# Patient Record
Sex: FEMALE | Race: WHITE | NOT HISPANIC OR LATINO | URBAN - METROPOLITAN AREA
[De-identification: names, ages, dates, MRNs, and addresses within clinical notes are randomized per-mention and may not be internally consistent; named-entity substitution may affect disease eponyms.]

---

## 2019-04-18 ENCOUNTER — ON CAMPUS - OUTPATIENT (AMBULATORY)
Dept: URBAN - METROPOLITAN AREA HOSPITAL 77 | Facility: HOSPITAL | Age: 59
End: 2019-04-18

## 2019-04-18 DIAGNOSIS — K20.8 OTHER ESOPHAGITIS: ICD-10-CM

## 2019-04-18 DIAGNOSIS — R13.10 DYSPHAGIA, UNSPECIFIED: ICD-10-CM

## 2019-04-18 DIAGNOSIS — K22.2 ESOPHAGEAL OBSTRUCTION: ICD-10-CM

## 2019-04-18 DIAGNOSIS — K21.9 GASTRO-ESOPHAGEAL REFLUX DISEASE WITHOUT ESOPHAGITIS: ICD-10-CM

## 2019-04-18 DIAGNOSIS — K44.9 DIAPHRAGMATIC HERNIA WITHOUT OBSTRUCTION OR GANGRENE: ICD-10-CM

## 2019-04-18 PROCEDURE — 43450 DILATE ESOPHAGUS 1/MULT PASS: CPT | Performed by: INTERNAL MEDICINE

## 2019-04-18 PROCEDURE — 43235 EGD DIAGNOSTIC BRUSH WASH: CPT | Performed by: INTERNAL MEDICINE

## 2019-10-31 ENCOUNTER — ON CAMPUS - OUTPATIENT (AMBULATORY)
Dept: URBAN - METROPOLITAN AREA HOSPITAL 77 | Facility: HOSPITAL | Age: 59
End: 2019-10-31

## 2019-10-31 DIAGNOSIS — K22.70 BARRETT'S ESOPHAGUS WITHOUT DYSPLASIA: ICD-10-CM

## 2019-10-31 DIAGNOSIS — K21.9 GASTRO-ESOPHAGEAL REFLUX DISEASE WITHOUT ESOPHAGITIS: ICD-10-CM

## 2019-10-31 DIAGNOSIS — R13.10 DYSPHAGIA, UNSPECIFIED: ICD-10-CM

## 2019-10-31 DIAGNOSIS — K44.9 DIAPHRAGMATIC HERNIA WITHOUT OBSTRUCTION OR GANGRENE: ICD-10-CM

## 2019-10-31 PROCEDURE — 43450 DILATE ESOPHAGUS 1/MULT PASS: CPT | Performed by: INTERNAL MEDICINE

## 2019-10-31 PROCEDURE — 43239 EGD BIOPSY SINGLE/MULTIPLE: CPT | Performed by: INTERNAL MEDICINE

## 2021-03-08 ENCOUNTER — TELEPHONE (OUTPATIENT)
Dept: PODIATRY | Facility: CLINIC | Age: 61
End: 2021-03-08

## 2021-03-08 NOTE — TELEPHONE ENCOUNTER
Caller: PATIENT    Relationship to patient: SELF    Best call back number: [phone]     Chief complaint: RIGHT FOOT PAIN    Type of visit NEW PATIENT       I  Additional notes:PT. CALLED TO SCHEDULE NEW PT. APPT. FOR RIGHT FOOT PAIN. PT. STATES THAT SHE FELL WHILE MOWING GRASS 6-7 MONTHS AGO ON HER FOOT, AND IS STILL HAVING PAIN.   SHE MADE APPT. FOR 03/25/21- WHICH WAS FIRST AVAILABLE FOR DR. MO.   SHE IS OK FOR THIS DATE, BUT WANTED TO CHECK TO SEE IF SHE NEEDS TO BE SEEN SOONER.   PLEASE ADVISE.

## 2021-03-17 ENCOUNTER — OFFICE VISIT (OUTPATIENT)
Dept: ORTHOPEDIC SURGERY | Facility: CLINIC | Age: 61
End: 2021-03-17

## 2021-03-17 VITALS
HEART RATE: 98 BPM | WEIGHT: 225 LBS | DIASTOLIC BLOOD PRESSURE: 95 MMHG | BODY MASS INDEX: 33.33 KG/M2 | SYSTOLIC BLOOD PRESSURE: 156 MMHG | HEIGHT: 69 IN

## 2021-03-17 DIAGNOSIS — M25.562 ACUTE PAIN OF LEFT KNEE: Primary | ICD-10-CM

## 2021-03-17 DIAGNOSIS — M17.12 PRIMARY OSTEOARTHRITIS OF LEFT KNEE: ICD-10-CM

## 2021-03-17 PROCEDURE — 99203 OFFICE O/P NEW LOW 30 MIN: CPT | Performed by: FAMILY MEDICINE

## 2021-03-17 RX ORDER — MELOXICAM 15 MG/1
15 TABLET ORAL DAILY
Qty: 30 TABLET | Refills: 0 | Status: SHIPPED | OUTPATIENT
Start: 2021-03-17 | End: 2021-04-09

## 2021-03-17 NOTE — PROGRESS NOTES
"Primary Care Sports Medicine Office Visit Note     Patient ID: Ophelia Funez is a 60 y.o. female.    Chief Complaint:  Chief Complaint   Patient presents with   • Left Knee - Pain     Pain X1 week     HPI:    Ms. Ophelia Funez is a 60 y.o. female who presents to the clinic today for evaluation of L knee pain. She state she had a fall in late august, when she rolled her R ankle. Since then, she feels she is \"walking differently\". She now is having a moderate amount of pain to her L knee. Achy soreness, deep inside of the knee. Points to the medial retinaculum and patellar tendon when describing her pain. Worse with going down stairs. Occasionally popping with normal walking. Mild instability as well. Has attempted aleve, advil, neither of which seem to help much.     No past medical history on file.    No past surgical history on file.    No family history on file.  Social History     Occupational History   • Not on file   Tobacco Use   • Smoking status: Never Smoker   Substance and Sexual Activity   • Alcohol use: Not on file   • Drug use: Not on file   • Sexual activity: Not on file      Review of Systems   Constitutional: Negative for activity change and fever.   Respiratory: Negative for cough and shortness of breath.    Cardiovascular: Negative for chest pain.   Gastrointestinal: Negative for constipation, diarrhea, nausea and vomiting.   Musculoskeletal: Positive for arthralgias.   Skin: Negative for color change and rash.   Neurological: Negative for weakness.   Hematological: Does not bruise/bleed easily.       Objective:    /95   Pulse 98   Ht 175.3 cm (69\")   Wt 102 kg (225 lb)   BMI 33.23 kg/m²     Physical Examination:  Physical Exam  Vitals and nursing note reviewed.   Constitutional:       General: She is not in acute distress.     Appearance: She is well-developed. She is not diaphoretic.   HENT:      Head: Normocephalic and atraumatic.   Eyes:      Conjunctiva/sclera: " Conjunctivae normal.   Pulmonary:      Effort: Pulmonary effort is normal. No respiratory distress.   Musculoskeletal:      Left knee: No effusion.      Instability Tests: Medial Jose J test negative and lateral Jose J test negative.   Skin:     General: Skin is warm.      Capillary Refill: Capillary refill takes less than 2 seconds.   Neurological:      Mental Status: She is alert.       Left Ankle Exam     Range of Motion   The patient has normal left ankle ROM.       Left Knee Exam     Muscle Strength   The patient has normal left knee strength.    Tenderness   The patient is experiencing tenderness in the lateral joint line, medial joint line and patella.    Range of Motion   Extension: normal   Flexion: normal     Tests   Jose J:  Medial - negative Lateral - negative  Varus: negative Valgus: negative  Left knee patellar apprehension test: +patellar grind testing, +evangelist ma testing.    Other   Erythema: absent  Sensation: normal  Pulse: present (distal to the knee, DP and PT palpable)  Swelling: none  Effusion: no effusion present          Imaging and other tests:  Three-view XR left knee yields mild tricompartmental joint space narrowing consistent with mild osteoarthritic change.    Assessment and Plan:    1. Acute pain of left knee  - XR Knee 3 View Left  - meloxicam (Mobic) 15 MG tablet; Take 1 tablet by mouth Daily.  Dispense: 30 tablet; Refill: 0  - Ambulatory Referral to Physical Therapy Evaluate and treat; Stretching, ROM, Strengthening    2. Primary osteoarthritis of left knee  - Ambulatory Referral to Physical Therapy Evaluate and treat; Stretching, ROM, Strengthening    After discussion of pathology, I recommended we take a conservative approach as this is early in the disease course.  I would like to see this patient attempt weight loss as every 1 pound lost will help take 4 pounds of pressure off the knees.  I also recommended icing 3 times daily as needed after activity.  The patient can  "try glucosamine/chondroitin supplementation for knee fluidity.  I would also like to see this patient exercise 20 to 30 minutes a day 3 days a week.  Working on quadriceps strengthening.  She was also given prescription for meloxicam for anti-inflammatory benefit.  RTC in 3 to 6 months if no improvement, or sooner if symptoms worsen.  We can always consider corticosteroid injection at that time if needed.      Albert LUO \"Chance\" Steven WEBSTER DO, CAQSM  03/17/21  16:56 EDT    Disclaimer: Please note that areas of this note were completed with computer voice recognition software.  Quite often unanticipated grammatical, syntax, homophones, and other interpretive errors are inadvertently transcribed by the computer software. Please excuse any errors that have escaped final proofreading.  "

## 2021-03-23 ENCOUNTER — TREATMENT (OUTPATIENT)
Dept: PHYSICAL THERAPY | Facility: CLINIC | Age: 61
End: 2021-03-23

## 2021-03-23 DIAGNOSIS — G89.29 CHRONIC PAIN OF LEFT KNEE: ICD-10-CM

## 2021-03-23 DIAGNOSIS — M17.12 PRIMARY OSTEOARTHRITIS OF LEFT KNEE: Primary | ICD-10-CM

## 2021-03-23 DIAGNOSIS — M25.562 CHRONIC PAIN OF LEFT KNEE: ICD-10-CM

## 2021-03-23 PROCEDURE — 97110 THERAPEUTIC EXERCISES: CPT | Performed by: PHYSICAL THERAPIST

## 2021-03-23 PROCEDURE — 97163 PT EVAL HIGH COMPLEX 45 MIN: CPT | Performed by: PHYSICAL THERAPIST

## 2021-03-23 NOTE — PROGRESS NOTES
Physical Therapy Initial Evaluation and Plan of Care    Patient: Ophelia Funez   : 1960  Diagnosis/ICD-10 Code:  Primary osteoarthritis of left knee [M17.12]  Referring practitioner: Albert Harris I*  Date of Initial Visit: 3/23/2021  Today's Date: 3/23/2021  Patient seen for 1 sessions           Subjective Questionnaire: Oxford knee L       Subjective Evaluation    History of Present Illness  Mechanism of injury: Patient reports that she woke up about 10 days ago with intense L knee pain.   She states that she was seen at the  due to the pain and also sent to ortho.  She was seen last Wed at ortho with PT referral and script for meloxicam. ( after careful chart review and verification with patient, she was only seen at the ortho office vs )  She states the knee does pop, with stiffness and intense pain. No history of ex.  Ophelia also states that she fall approx 7 months ago with intense R ankle pain and swelling; she did not seek medical assessment for the foot.  However, due to continued pain and swelling she [facility]. Ophelia notes that she is unable to wear regular shoes due to the R ankle pain and swelling.   Ms Funez reports that descending stairs are worse than ascend ( both are painful)  TIME OF DAY:  No change in the pain.    SLEEP:  Normal is BSL.  Still sleeps on side; pain wakes her some of the time.    1ST AM:  Very stiff  PAST HISTORY:  CA, DM  Pacemaker, heart seizures or metal implants.       Patient Occupation: customer ; seated, computer Quality of life: good    Pain  Current pain ratin  At best pain ratin  At worst pain rating: 10  Relieving factors: medications  Aggravating factors: stairs    Social Support  Lives in: multiple-level home (bi level house; no steps from the garage.)    Hand dominance: right    Treatments  Current treatment: medication  Patient Goals  Patient goals for therapy:  decreased pain  Patient goal: ex and walk            Objective          Observations     Additional Ankle/Foot Observation Details  Bilateral 1+ pitting edema with min hairs and a shiny appearance.  Appearance also noted in the gait section of the evaluation.     Tenderness   Left Knee   Tenderness in the lateral joint line, medial joint line, patellar tendon and tibial tubercle.     Right Ankle/Foot   Tenderness in the Achilles insertion, fifth metatarsal base, lateral malleolus, navicular and posterior tibial tendon.     Active Range of Motion   Left Knee   Flexion: 103 degrees   Extensor la degrees     Right Ankle/Foot   Dorsiflexion (kf): 6 degrees   Plantar flexion: WFL and with pain  Inversion: 23 degrees   Eversion: 9 degrees with pain    Patellar Mobility   Left Knee Hypomobile in the left medial, left lateral, left superior and left inferior patellar tendon(s).     Strength/Myotome Testing     Left Knee   Flexion: 3+  Extension: 3+    Right Ankle/Foot   Dorsiflexion: 4-  Plantar flexion: 3+  Inversion: 4-  Eversion: 4-    Tests     Left Knee   Positive patella-femoral grind.   Negative lateral Jose J and medial Jose J.     Ambulation     Observational Gait   Gait: antalgic   Decreased walking speed, stride length and left stance time.   Left foot contact pattern: foot flat  Right foot contact pattern: foot flat    Additional Observational Gait Details  Patient wore slip on sandals into the clinic for the eval; states she is unable to wear enclosed shoes due to the foot swelling and ankle pain.  Severe Pes planus, hallux valgus, subluxed hunter during stance and saravanan bunions.   Gait is with L knee locked and increased R hip ER, no heel strike, more of a slide bilateral.           Assessment & Plan     Assessment  Impairments: abnormal gait, abnormal muscle firing, abnormal or restricted ROM, activity intolerance, impaired balance, impaired physical strength, lacks appropriate home exercise program and  pain with function  Assessment details: Ms. Funez is a 60 yr old female referred to PT due to a recent onset of L knee pain and difficulty walking due to the pain.   She also reports during the history intake a fall approx 7 months ago resulting in R ankle pain.  The initial PT evaluation was completed today for both areas.  Deficits noted of the L knee include reduced and painful active motion, weakness, multiple gait deficits and inability to tolerate standing or difficulty with stairs.  She lives in a bi-level home; thus steps are required on a daily basis.  The R ankle presents with reduced ankle impairments, also including a reduction in her motion, strength and reduced ability to stand/ambulate.  There are bilateral structural feet mal-alignments including pes planus, hallux valgus, hammer toes. The treatment plan will be designed to address both areas.    EVAL COMPLEXITY:  High  # areas assess:  > 3  Prior history >3  evloving  Decision making   Prognosis: good  Prognosis details: Adverse factors:  High BMI, no prior ex, structural foot impairments,  Functional Limitations: walking, uncomfortable because of pain and standing  Goals  Plan Goals: ST visits     1)  Pain levels 2-6 L knee     2)  L knee and R ankle motion WNLs     3)  Patient to report increase tolerance to standing by 15 min intervals     4)  Gait with min hi ER, evidence of heel strike, no locking of the L knee throughout the gait cycle     5)  Compliance with current  HEP    LTGs   DC     1)  0-3/10 L knee and R ankle pain     2)  Normal gait cycle     3)  MMT L knee and R ankle =/> 4/5 wo greater pain     4)  Patient to report improved ability to ascend and descend steps relating to the L knee and R ankle     5)  Dillon in final home ex program for management at home     6)  Improve the Oxford knee score =/> 10 points    Plan  Therapy options: will be seen for skilled physical therapy services  Planned modality interventions:  cryotherapy, dry needling, high voltage pulsed current (pain management), thermotherapy (hydrocollator packs) and ultrasound  Planned therapy interventions: neuromuscular re-education, soft tissue mobilization, therapeutic activities, stretching, strengthening, joint mobilization, home exercise program, gait training, flexibility and balance/weight-bearing training  Frequency: 2x week  Treatment plan discussed with: patient  Plan details: 36 visits up to 12 weeks/90 days max time frame for completion.         Timed:         Manual Therapy:         mins  80759;     Therapeutic Exercise:    17     mins  52821;     Neuromuscular Yennifer:        mins  10290;    Therapeutic Activity:          mins  98164;     Gait Training:           mins  62029;     Ultrasound:          mins  10074;    Ionto                                   mins   87034  Self Care                       3     mins   94976  Canalith Repos         mins 47163      Un-Timed:  Electrical Stimulation:         mins  60373 ( );  Dry Needling          mins self-pay  Traction          mins 36309  Low Eval          Mins  24168  Mod Eval          Mins  43003  High Eval                       29     Mins  27476  Re-Eval                               mins  01400        Timed Treatment:   20   mins   Total Treatment:     49   mins    PT SIGNATURE: Taylor Rodriguez, CANDIDA   DATE TREATMENT INITIATED: 3/23/2021    Initial Certification  Certification Period: 6/21/2021  I certify that the therapy services are furnished while this patient is under my care.  The services outlined above are required by this patient, and will be reviewed every 90 days.     PHYSICIAN: Albert Harris II, DO      DATE:     Please sign and return via fax to [phone].. Thank you, [facility].

## 2021-03-25 ENCOUNTER — OFFICE VISIT (OUTPATIENT)
Dept: PODIATRY | Facility: CLINIC | Age: 61
End: 2021-03-25

## 2021-03-25 VITALS
SYSTOLIC BLOOD PRESSURE: 146 MMHG | HEART RATE: 80 BPM | WEIGHT: 261 LBS | HEIGHT: 69 IN | DIASTOLIC BLOOD PRESSURE: 87 MMHG | BODY MASS INDEX: 38.66 KG/M2

## 2021-03-25 DIAGNOSIS — S82.841P: ICD-10-CM

## 2021-03-25 DIAGNOSIS — M19.171 POST-TRAUMATIC OSTEOARTHRITIS OF RIGHT ANKLE: ICD-10-CM

## 2021-03-25 DIAGNOSIS — M25.571 ACUTE RIGHT ANKLE PAIN: Primary | ICD-10-CM

## 2021-03-25 DIAGNOSIS — M20.11 ACQUIRED HALLUX VALGUS, RIGHT: ICD-10-CM

## 2021-03-25 DIAGNOSIS — M19.071 PRIMARY OSTEOARTHRITIS OF RIGHT FOOT: ICD-10-CM

## 2021-03-25 PROCEDURE — 99203 OFFICE O/P NEW LOW 30 MIN: CPT | Performed by: PODIATRIST

## 2021-03-25 NOTE — PROGRESS NOTES
03/25/2021  Foot and Ankle Surgery - New Patient   Provider: Dr. Hector Diego DPM  Location: [facility]    Subjective:  Ophelia Funez is a 60 y.o. female.     Chief Complaint   Patient presents with   • Right Foot - Pain       HPI: Patient is a 60-year-old female that presents with issues involving her right ankle.  She sustained a fall in August of last year and did not feel that she had any significant injury as she could bear weight.  She has dealt with progressive pain that has intensified over the last few months.  She rates the pain a 9 out of 10 at times.  She states that she requires sitting throughout the day to manage the pain.  She does feel like her foot is progressively getting flatter.  She is concerned that symptoms will progress and further limitation will ensue.  She denies any significant issues involving her left foot.  She states that she has always had significant bunion deformities to both feet.    Allergies   Allergen Reactions   • Codeine Palpitations and Rash       History reviewed. No pertinent past medical history.    History reviewed. No pertinent surgical history.    Family History   Problem Relation Age of Onset   • No Known Problems Mother    • No Known Problems Father        Social History     Socioeconomic History   • Marital status:      Spouse name: Not on file   • Number of children: Not on file   • Years of education: Not on file   • Highest education level: Not on file   Tobacco Use   • Smoking status: Never Smoker   • Smokeless tobacco: Never Used   Vaping Use   • Vaping Use: Never used   Substance and Sexual Activity   • Alcohol use: Never   • Drug use: Defer   • Sexual activity: Defer        Current Outpatient Medications on File Prior to Visit   Medication Sig Dispense Refill   • meloxicam (Mobic) 15 MG tablet Take 1 tablet by mouth Daily. 30 tablet 0     No current facility-administered medications on file prior to visit.       Review of  "Systems:  General: Denies fever, chills, fatigue, and weakness.  Eyes: Denies vision loss, blurry vision, and excessive redness.  ENT: Denies hearing issues and difficulty swallowing.  Cardiovascular: Denies palpitations, chest pain, or syncopal episodes.  Respiratory: Denies shortness of breath, wheezing, and coughing.  GI: Denies abdominal pain, nausea, and vomiting.   : Denies frequency, hematuria, and urgency.  Musculoskeletal: + Right ankle pain  Derm: Denies rash, open wounds, or suspicious lesions.  Neuro: Denies headaches, numbness, loss of coordination, and tremors.  Psych: Denies anxiety and depression.  Endocrine: Denies temperature intolerance and changes in appetite.  Heme: Denies bleeding disorders or abnormal bruising.     Objective   /87   Pulse 80   Ht 175.3 cm (69\")   Wt 118 kg (261 lb)   BMI 38.54 kg/m²     Foot/Ankle Exam:       General:   Appearance: appears stated age and healthy    Orientation: AAOx3    Affect: appropriate    Gait: antalgic    Gait comment:  Significant pronation and forefoot abduction with stance and gait    VASCULAR      Right Foot Vascularity   Normal vascular exam    Dorsalis pedis:  2+  Posterior tibial:  2+  Skin Temperature: warm    Edema Gradin+ and pitting  CFT:  < 3 seconds  Pedal Hair Growth:  Absent      NEUROLOGIC     Right Foot Neurologic   Light touch sensation:  Normal  Hot/Cold sensation: normal    Protective Sensation using [place]-H[place] Monofilament:  10  Achilles reflex:  2+     MUSCULOSKELETAL      Right Foot Musculoskeletal   Ecchymosis:  None  Tenderness: medial malleolus, lateral malleolus, subtalar joint, posterior tibial tendon and anterior tibiotalar joint line    Arch:  Pes planus  Hammertoe:  Second toe, third toe, fourth toe and fifth toe  Hallux valgus: Yes (Severe deformity)       MUSCLE STRENGTH     Right Foot Muscle Strength   Foot dorsiflexion:  4+  Foot plantar flexion:  4+  Foot inversion:  4+  Foot eversion:  4+     " DERMATOLOGIC     Right Foot Dermatologic   Skin: skin intact        Right Foot Additional Comments Significant valgus deformity involving the tibiotalar joint.  Prominent instability across the deltoid ligament.  Diffuse discomfort to the perimalleoli region.  No proximal fibular pain.  No significant discomfort involving the foot.      Assessment/Plan   Diagnoses and all orders for this visit:    1. Acute right ankle pain (Primary)  -     XR Ankle 3+ View Right    2. Post-traumatic osteoarthritis of right ankle  -     CBC (No Diff); Future  -     Basic Metabolic Panel; Future  -     ECG 12 Lead; Future  -     XR Chest 2 View; Future  -     ceFAZolin (ANCEF) 3 g in sodium chloride 0.9 % 100 mL IVPB  -     Case Request; Standing    3. Acquired hallux valgus, right  -     XR Foot 3+ View Right    4. Primary osteoarthritis of right foot    5. Displaced bimalleolar fracture, right, closed, with malunion, subsequent encounter  -     CBC (No Diff); Future  -     Basic Metabolic Panel; Future  -     ECG 12 Lead; Future  -     XR Chest 2 View; Future  -     ceFAZolin (ANCEF) 3 g in sodium chloride 0.9 % 100 mL IVPB  -     Case Request; Standing    Other orders  -     Follow Anesthesia Guidelines / Standing Orders; Future  -     Obtain Informed Consent; Future  -     Provide NPO Instructions to Patient; Future  -     Chlorhexidine Skin Prep; Future  -     Follow Anesthesia Guidelines / Standing Orders; Standing  -     Clip Operative Site; Standing  -     Verify / Perform Chlorhexidine Skin Prep; Standing  -     Verify / Perform Chlorhexidine Skin Prep if Indicated (If Not Already Completed); Standing      Patient presents with issue involving the right ankle.  She has substantial deformity involving the ankle which has gradually progressed since an injury of last year in August.  Imaging was reviewed showing findings consistent with bimalleolar ankle fracture equivalent and malunion.  She currently has lateral displacement  of the tibiotalar joint with valgus angulation of the talus.  She has significant posttraumatic arthritis.  We did review the imaging, diagnosis, and treatment options at length.  Unfortunately, I do not feel that any conservative options will offer any lasting effects.  We did discuss bracing however she is basically bearing weight to the medial malleolus at this time and I do feel that she will eventually create further complications.  We did discuss surgical consideration and I feel that the only option is ankle arthrodesis with lateral malleolus takedown.  I reviewed the procedure, risk, goals, and recovery at length.  She does understand that she will require several weeks of nonweightbearing activity after surgery.  I have dispensed a lace up ankle brace for her to wear at this time.  Patient would like to proceed with the surgery in the near future.  We will perform the preoperative lab work and imaging to proceed.  She is to call with any additional issues or concerns.    Orders Placed This Encounter   Procedures   • XR Ankle 3+ View Right     Order Specific Question:   Reason for Exam:     Answer:   Right ankle pain after a fall last August has had pain since RM 15 WB     Order Specific Question:   Does this patient have a diabetic monitoring/medication delivering device on?     Answer:   No   • XR Foot 3+ View Right     Weight Bearing     Order Specific Question:   Reason for Exam:     Answer:   Right foot pain she does have bunion with toe pain RM 15 WB   • XR Chest 2 View     Standing Status:   Future     Standing Expiration Date:   3/25/2022     Order Specific Question:   Reason for Exam:     Answer:   Preop   • CBC (No Diff)     Standing Status:   Future     Standing Expiration Date:   3/25/2022   • Basic Metabolic Panel     Standing Status:   Future     Standing Expiration Date:   3/25/2022   • Follow Anesthesia Guidelines / Standing Orders     Standing Status:   Future   • Obtain Informed Consent      Standing Status:   Future     Order Specific Question:   Informed Consent Given For     Answer:   Right sided ankle arthrodesis with lateral malleolus takedown   • Provide NPO Instructions to Patient     Standing Status:   Future   • Chlorhexidine Skin Prep     Chlorhexidine Skin Prep and Instructions For All Patients Having A Procedure Requiring an Outward Incision if Not Allergic. If Allergic, Give Antibacterial Skin Wipes and Instructions. Do Not Use For Facial Cases or on Any Mucus Membranes.     Standing Status:   Future   • ECG 12 Lead     Standing Status:   Future     Standing Expiration Date:   3/25/2022     Order Specific Question:   Reason for Exam:     Answer:   Preop        Note is dictated utilizing voice recognition software. Unfortunately this leads to occasional typographical errors. I apologize in advance if the situation occurs. If questions occur please do not hesitate to call our office.

## 2021-03-26 PROBLEM — S82.841P: Status: ACTIVE | Noted: 2021-03-26

## 2021-03-26 PROBLEM — M19.171 POST-TRAUMATIC OSTEOARTHRITIS OF RIGHT ANKLE: Status: ACTIVE | Noted: 2021-03-26

## 2021-04-08 ENCOUNTER — TELEPHONE (OUTPATIENT)
Dept: PODIATRY | Facility: CLINIC | Age: 61
End: 2021-04-08

## 2021-04-08 DIAGNOSIS — M25.562 ACUTE PAIN OF LEFT KNEE: ICD-10-CM

## 2021-04-08 RX ORDER — OMEPRAZOLE 40 MG/1
40 CAPSULE, DELAYED RELEASE ORAL DAILY
COMMUNITY

## 2021-04-08 RX ORDER — MULTIPLE VITAMINS W/ MINERALS TAB 9MG-400MCG
1 TAB ORAL DAILY
COMMUNITY

## 2021-04-09 RX ORDER — MELOXICAM 15 MG/1
TABLET ORAL
Qty: 30 TABLET | Refills: 0 | Status: SHIPPED | OUTPATIENT
Start: 2021-04-09 | End: 2021-04-16 | Stop reason: HOSPADM

## 2021-04-12 ENCOUNTER — HOSPITAL ENCOUNTER (OUTPATIENT)
Dept: CARDIOLOGY | Facility: HOSPITAL | Age: 61
Discharge: HOME | End: 2021-04-12

## 2021-04-12 ENCOUNTER — HOSPITAL ENCOUNTER (OUTPATIENT)
Dept: GENERAL RADIOLOGY | Facility: HOSPITAL | Age: 61
Discharge: HOME | End: 2021-04-12

## 2021-04-12 ENCOUNTER — LAB (OUTPATIENT)
Dept: LAB | Facility: HOSPITAL | Age: 61
End: 2021-04-12

## 2021-04-12 DIAGNOSIS — M19.171 POST-TRAUMATIC OSTEOARTHRITIS OF RIGHT ANKLE: ICD-10-CM

## 2021-04-12 DIAGNOSIS — S82.841P: ICD-10-CM

## 2021-04-12 LAB
ANION GAP SERPL CALCULATED.3IONS-SCNC: 12.6 MMOL/L (ref 5–15)
BUN SERPL-MCNC: 14 MG/DL (ref 6–20)
BUN/CREAT SERPL: 15.7 (ref 7–25)
CALCIUM SPEC-SCNC: 9.7 MG/DL (ref 8.6–10.5)
CHLORIDE SERPL-SCNC: 102 MMOL/L (ref 98–107)
CO2 SERPL-SCNC: 25.4 MMOL/L (ref 22–29)
CREAT SERPL-MCNC: 0.89 MG/DL (ref 0.57–1)
DEPRECATED RDW RBC AUTO: 39.7 FL (ref 37–54)
ERYTHROCYTE [DISTWIDTH] IN BLOOD BY AUTOMATED COUNT: 12.2 % (ref 12.3–15.4)
GFR SERPL CREATININE-BSD FRML MDRD: 65 ML/MIN/1.73
GLUCOSE SERPL-MCNC: 116 MG/DL (ref 65–99)
HCT VFR BLD AUTO: 49.8 % (ref 34–46.6)
HGB BLD-MCNC: 16.6 G/DL (ref 12–15.9)
MCH RBC QN AUTO: 29.8 PG (ref 26.6–33)
MCHC RBC AUTO-ENTMCNC: 33.3 G/DL (ref 31.5–35.7)
MCV RBC AUTO: 89.4 FL (ref 79–97)
PLATELET # BLD AUTO: 271 10*3/MM3 (ref 140–450)
PMV BLD AUTO: 10.2 FL (ref 6–12)
POTASSIUM SERPL-SCNC: 4.2 MMOL/L (ref 3.5–5.2)
RBC # BLD AUTO: 5.57 10*6/MM3 (ref 3.77–5.28)
SODIUM SERPL-SCNC: 140 MMOL/L (ref 136–145)
WBC # BLD AUTO: 6.68 10*3/MM3 (ref 3.4–10.8)

## 2021-04-12 PROCEDURE — 71046 X-RAY EXAM CHEST 2 VIEWS: CPT

## 2021-04-12 PROCEDURE — 80048 BASIC METABOLIC PNL TOTAL CA: CPT

## 2021-04-12 PROCEDURE — 93010 ELECTROCARDIOGRAM REPORT: CPT | Performed by: INTERNAL MEDICINE

## 2021-04-12 PROCEDURE — 93005 ELECTROCARDIOGRAM TRACING: CPT | Performed by: PODIATRIST

## 2021-04-12 PROCEDURE — 36415 COLL VENOUS BLD VENIPUNCTURE: CPT

## 2021-04-12 PROCEDURE — 85027 COMPLETE CBC AUTOMATED: CPT

## 2021-04-14 ENCOUNTER — OFFICE VISIT (OUTPATIENT)
Dept: ORTHOPEDIC SURGERY | Facility: CLINIC | Age: 61
End: 2021-04-14

## 2021-04-14 ENCOUNTER — LAB (OUTPATIENT)
Dept: LAB | Facility: HOSPITAL | Age: 61
End: 2021-04-14

## 2021-04-14 VITALS — HEIGHT: 69 IN | WEIGHT: 252 LBS | BODY MASS INDEX: 37.33 KG/M2

## 2021-04-14 DIAGNOSIS — M17.12 PRIMARY OSTEOARTHRITIS OF LEFT KNEE: Primary | ICD-10-CM

## 2021-04-14 LAB — SARS-COV-2 ORF1AB RESP QL NAA+PROBE: NOT DETECTED

## 2021-04-14 PROCEDURE — 99213 OFFICE O/P EST LOW 20 MIN: CPT | Performed by: FAMILY MEDICINE

## 2021-04-14 PROCEDURE — C9803 HOPD COVID-19 SPEC COLLECT: HCPCS

## 2021-04-14 PROCEDURE — U0004 COV-19 TEST NON-CDC HGH THRU: HCPCS

## 2021-04-14 RX ORDER — DICLOFENAC SODIUM 20 MG/G
40 SOLUTION TOPICAL 2 TIMES DAILY
Qty: 112 G | Refills: 3 | Status: SHIPPED | OUTPATIENT
Start: 2021-04-14 | End: 2022-06-30

## 2021-04-14 NOTE — PROGRESS NOTES
"Primary Care Sports Medicine Office Visit Note     Patient ID: Ophelia Funez is a 60 y.o. female.    Chief Complaint:  Chief Complaint   Patient presents with   • Left Knee - Follow-up     HPI:    Ms. Ophelia Funez is a 60 y.o. female who presents to the clinic today for follow-up evaluation of left knee osteoarthritis.  The patient was previously seen and evaluated, and conservative measures were chosen.  She has been taking meloxicam anti-inflammatory since previous visit as instructed.  She has also started physical therapy for quadricep strengthening of the left knee.  Attempting weight loss as well.  She states that with conservative measures, her knee pain was doing incredible well. She had returned to walking without a limp on meloxicam. Was doing very well. Unfortunately, she has had to stop taking her mobic due to impending foot surgery, and pain has again worsened/returned.  Similar in severity and location as previous.  No new injuries or falls.    No past medical history on file.    Past Surgical History:   Procedure Laterality Date   • ENDOSCOPY     • HERNIA REPAIR      as a child       Family History   Problem Relation Age of Onset   • No Known Problems Mother    • No Known Problems Father      Social History     Occupational History   • Not on file   Tobacco Use   • Smoking status: Never Smoker   • Smokeless tobacco: Never Used   Vaping Use   • Vaping Use: Never used   Substance and Sexual Activity   • Alcohol use: Never   • Drug use: Defer   • Sexual activity: Defer      Review of Systems   Constitutional: Negative for activity change and fever.   Musculoskeletal: Positive for arthralgias.   Skin: Negative for color change and rash.   Neurological: Negative for weakness.       Objective:    Ht 175.3 cm (69\")   Wt 114 kg (252 lb)   BMI 37.21 kg/m²     Physical Examination:  Physical Exam  Vitals and nursing note reviewed.   Constitutional:       General: She is not in acute distress.   " "  Appearance: She is well-developed. She is not diaphoretic.   HENT:      Head: Normocephalic and atraumatic.   Eyes:      Conjunctiva/sclera: Conjunctivae normal.   Pulmonary:      Effort: Pulmonary effort is normal. No respiratory distress.   Musculoskeletal:      Left knee: No effusion.      Instability Tests: Medial Jose J test negative and lateral Jose J test negative.   Skin:     General: Skin is warm.      Capillary Refill: Capillary refill takes less than 2 seconds.   Neurological:      Mental Status: She is alert.       Left Ankle Exam     Range of Motion   The patient has normal left ankle ROM.       Left Knee Exam     Muscle Strength   The patient has normal left knee strength.    Tenderness   The patient is experiencing tenderness in the lateral joint line, medial joint line and patella.    Range of Motion   Extension: normal   Flexion: normal     Tests   Jose J:  Medial - negative Lateral - negative  Varus: negative Valgus: negative  Left knee patellar apprehension test: +patellar grind testing, +evangelist ma testing.    Other   Erythema: absent  Sensation: normal  Pulse: present (distal to the knee, DP and PT palpable)  Swelling: none  Effusion: no effusion present        Imaging and other tests:  No new imaging today.  Assessment and Plan:    1. Primary osteoarthritis of left knee    Patient seems to have an excellent response to anti-inflammatory medication.  Unfortunately with stopping this with upcoming surgery, her symptomatology has returned.  I would like for her to attempt topical anti-inflammatory in the form of Pennsaid (very low systemic absorption, less than 7%) over the next few days leading up to surgery.  Otherwise return to meloxicam after surgical procedure.  RTC to me as needed.    Albert LUO \"Chance\" Steven WEBSTER DO, CAQSM  04/14/21  15:23 EDT    Disclaimer: Please note that areas of this note were completed with computer voice recognition software.  Quite often unanticipated " grammatical, syntax, homophones, and other interpretive errors are inadvertently transcribed by the computer software. Please excuse any errors that have escaped final proofreading.

## 2021-04-15 ENCOUNTER — ANESTHESIA EVENT (OUTPATIENT)
Dept: PERIOP | Facility: HOSPITAL | Age: 61
End: 2021-04-15

## 2021-04-16 ENCOUNTER — APPOINTMENT (OUTPATIENT)
Dept: GENERAL RADIOLOGY | Facility: HOSPITAL | Age: 61
End: 2021-04-16

## 2021-04-16 ENCOUNTER — HOSPITAL ENCOUNTER (OUTPATIENT)
Facility: HOSPITAL | Age: 61
Setting detail: HOSPITAL OUTPATIENT SURGERY
Discharge: HOME | End: 2021-04-16
Attending: PODIATRIST | Admitting: PODIATRIST

## 2021-04-16 ENCOUNTER — ANESTHESIA (OUTPATIENT)
Dept: PERIOP | Facility: HOSPITAL | Age: 61
End: 2021-04-16

## 2021-04-16 VITALS
OXYGEN SATURATION: 98 % | BODY MASS INDEX: 36.85 KG/M2 | RESPIRATION RATE: 16 BRPM | DIASTOLIC BLOOD PRESSURE: 61 MMHG | SYSTOLIC BLOOD PRESSURE: 135 MMHG | HEART RATE: 95 BPM | WEIGHT: 248.8 LBS | HEIGHT: 69 IN | TEMPERATURE: 96.8 F

## 2021-04-16 DIAGNOSIS — M19.171 POST-TRAUMATIC OSTEOARTHRITIS OF RIGHT ANKLE: ICD-10-CM

## 2021-04-16 DIAGNOSIS — S82.841P: ICD-10-CM

## 2021-04-16 PROCEDURE — 73620 X-RAY EXAM OF FOOT: CPT

## 2021-04-16 PROCEDURE — 27635 REMOVE LOWER LEG BONE LESION: CPT | Performed by: PODIATRIST

## 2021-04-16 PROCEDURE — 25010000002 FENTANYL CITRATE (PF) 100 MCG/2ML SOLUTION: Performed by: ANESTHESIOLOGIST ASSISTANT

## 2021-04-16 PROCEDURE — 25010000002 DEXAMETHASONE PER 1 MG: Performed by: ANESTHESIOLOGIST ASSISTANT

## 2021-04-16 PROCEDURE — 25010000002 ONDANSETRON PER 1 MG: Performed by: ANESTHESIOLOGIST ASSISTANT

## 2021-04-16 PROCEDURE — 76000 FLUOROSCOPY <1 HR PHYS/QHP: CPT

## 2021-04-16 PROCEDURE — 25010000002 MIDAZOLAM PER 1 MG: Performed by: ANESTHESIOLOGY

## 2021-04-16 PROCEDURE — 76942 ECHO GUIDE FOR BIOPSY: CPT | Performed by: PODIATRIST

## 2021-04-16 PROCEDURE — C1713 ANCHOR/SCREW BN/BN,TIS/BN: HCPCS | Performed by: PODIATRIST

## 2021-04-16 PROCEDURE — C1769 GUIDE WIRE: HCPCS | Performed by: PODIATRIST

## 2021-04-16 PROCEDURE — 25010000002 HYDROMORPHONE PER 4 MG: Performed by: ANESTHESIOLOGIST ASSISTANT

## 2021-04-16 PROCEDURE — 25010000002 PROPOFOL 10 MG/ML EMULSION: Performed by: ANESTHESIOLOGIST ASSISTANT

## 2021-04-16 PROCEDURE — 25010000002 DEXAMETHASONE PER 1 MG: Performed by: ANESTHESIOLOGY

## 2021-04-16 PROCEDURE — 25010000002 FENTANYL CITRATE (PF) 100 MCG/2ML SOLUTION: Performed by: ANESTHESIOLOGY

## 2021-04-16 PROCEDURE — 25010000002 KETOROLAC TROMETHAMINE PER 15 MG: Performed by: ANESTHESIOLOGIST ASSISTANT

## 2021-04-16 PROCEDURE — 27870 FUSION OF ANKLE JOINT OPEN: CPT | Performed by: PODIATRIST

## 2021-04-16 PROCEDURE — 25010000002 ROPIVACAINE PER 1 MG: Performed by: ANESTHESIOLOGY

## 2021-04-16 PROCEDURE — 27685 REVISION OF LOWER LEG TENDON: CPT | Performed by: PODIATRIST

## 2021-04-16 DEVICE — IMPLANTABLE DEVICE: Type: IMPLANTABLE DEVICE | Site: ANKLE | Status: FUNCTIONAL

## 2021-04-16 DEVICE — IMPLANTABLE DEVICE
Type: IMPLANTABLE DEVICE | Site: ANKLE | Status: FUNCTIONAL
Brand: DARCO

## 2021-04-16 DEVICE — IMPLANTABLE DEVICE
Type: IMPLANTABLE DEVICE | Site: ANKLE | Status: FUNCTIONAL
Brand: ALLOMATRIX

## 2021-04-16 DEVICE — INJ BONE AUG 3CC: Type: IMPLANTABLE DEVICE | Site: ANKLE | Status: FUNCTIONAL

## 2021-04-16 RX ORDER — HYDROMORPHONE HCL 110MG/55ML
0.25 PATIENT CONTROLLED ANALGESIA SYRINGE INTRAVENOUS AS NEEDED
Status: DISCONTINUED | OUTPATIENT
Start: 2021-04-16 | End: 2021-04-16 | Stop reason: HOSPADM

## 2021-04-16 RX ORDER — DROPERIDOL 2.5 MG/ML
0.62 INJECTION, SOLUTION INTRAMUSCULAR; INTRAVENOUS ONCE AS NEEDED
Status: DISCONTINUED | OUTPATIENT
Start: 2021-04-16 | End: 2021-04-16 | Stop reason: HOSPADM

## 2021-04-16 RX ORDER — DEXAMETHASONE SODIUM PHOSPHATE 4 MG/ML
INJECTION, SOLUTION INTRA-ARTICULAR; INTRALESIONAL; INTRAMUSCULAR; INTRAVENOUS; SOFT TISSUE
Status: COMPLETED | OUTPATIENT
Start: 2021-04-16 | End: 2021-04-16

## 2021-04-16 RX ORDER — DROPERIDOL 2.5 MG/ML
1.25 INJECTION, SOLUTION INTRAMUSCULAR; INTRAVENOUS ONCE AS NEEDED
Status: DISCONTINUED | OUTPATIENT
Start: 2021-04-16 | End: 2021-04-16 | Stop reason: HOSPADM

## 2021-04-16 RX ORDER — KETOROLAC TROMETHAMINE 30 MG/ML
INJECTION, SOLUTION INTRAMUSCULAR; INTRAVENOUS AS NEEDED
Status: DISCONTINUED | OUTPATIENT
Start: 2021-04-16 | End: 2021-04-16 | Stop reason: SURG

## 2021-04-16 RX ORDER — ROPIVACAINE HYDROCHLORIDE 5 MG/ML
INJECTION, SOLUTION EPIDURAL; INFILTRATION; PERINEURAL
Status: COMPLETED | OUTPATIENT
Start: 2021-04-16 | End: 2021-04-16

## 2021-04-16 RX ORDER — SODIUM CHLORIDE 0.9 % (FLUSH) 0.9 %
10 SYRINGE (ML) INJECTION EVERY 12 HOURS SCHEDULED
Status: DISCONTINUED | OUTPATIENT
Start: 2021-04-16 | End: 2021-04-16 | Stop reason: HOSPADM

## 2021-04-16 RX ORDER — LIDOCAINE HYDROCHLORIDE 10 MG/ML
0.5 INJECTION, SOLUTION EPIDURAL; INFILTRATION; INTRACAUDAL; PERINEURAL ONCE AS NEEDED
Status: DISCONTINUED | OUTPATIENT
Start: 2021-04-16 | End: 2021-04-16 | Stop reason: HOSPADM

## 2021-04-16 RX ORDER — FENTANYL CITRATE 50 UG/ML
25 INJECTION, SOLUTION INTRAMUSCULAR; INTRAVENOUS
Status: DISCONTINUED | OUTPATIENT
Start: 2021-04-16 | End: 2021-04-16 | Stop reason: HOSPADM

## 2021-04-16 RX ORDER — DEXAMETHASONE SODIUM PHOSPHATE 4 MG/ML
INJECTION, SOLUTION INTRA-ARTICULAR; INTRALESIONAL; INTRAMUSCULAR; INTRAVENOUS; SOFT TISSUE AS NEEDED
Status: DISCONTINUED | OUTPATIENT
Start: 2021-04-16 | End: 2021-04-16 | Stop reason: SURG

## 2021-04-16 RX ORDER — MIDAZOLAM HYDROCHLORIDE 1 MG/ML
INJECTION INTRAMUSCULAR; INTRAVENOUS
Status: COMPLETED | OUTPATIENT
Start: 2021-04-16 | End: 2021-04-16

## 2021-04-16 RX ORDER — DIPHENHYDRAMINE HYDROCHLORIDE 50 MG/ML
12.5 INJECTION INTRAMUSCULAR; INTRAVENOUS AS NEEDED
Status: DISCONTINUED | OUTPATIENT
Start: 2021-04-16 | End: 2021-04-16 | Stop reason: HOSPADM

## 2021-04-16 RX ORDER — EPHEDRINE SULFATE 50 MG/ML
5 INJECTION, SOLUTION INTRAVENOUS ONCE AS NEEDED
Status: DISCONTINUED | OUTPATIENT
Start: 2021-04-16 | End: 2021-04-16 | Stop reason: HOSPADM

## 2021-04-16 RX ORDER — FLUMAZENIL 0.1 MG/ML
0.2 INJECTION INTRAVENOUS AS NEEDED
Status: DISCONTINUED | OUTPATIENT
Start: 2021-04-16 | End: 2021-04-16 | Stop reason: HOSPADM

## 2021-04-16 RX ORDER — ONDANSETRON 2 MG/ML
INJECTION INTRAMUSCULAR; INTRAVENOUS AS NEEDED
Status: DISCONTINUED | OUTPATIENT
Start: 2021-04-16 | End: 2021-04-16 | Stop reason: SURG

## 2021-04-16 RX ORDER — SODIUM CHLORIDE, SODIUM LACTATE, POTASSIUM CHLORIDE, CALCIUM CHLORIDE 600; 310; 30; 20 MG/100ML; MG/100ML; MG/100ML; MG/100ML
9 INJECTION, SOLUTION INTRAVENOUS CONTINUOUS PRN
Status: DISCONTINUED | OUTPATIENT
Start: 2021-04-16 | End: 2021-04-16 | Stop reason: HOSPADM

## 2021-04-16 RX ORDER — LABETALOL HYDROCHLORIDE 5 MG/ML
5 INJECTION, SOLUTION INTRAVENOUS
Status: DISCONTINUED | OUTPATIENT
Start: 2021-04-16 | End: 2021-04-16 | Stop reason: HOSPADM

## 2021-04-16 RX ORDER — FENTANYL CITRATE 50 UG/ML
100 INJECTION, SOLUTION INTRAMUSCULAR; INTRAVENOUS
Status: DISCONTINUED | OUTPATIENT
Start: 2021-04-16 | End: 2021-04-16 | Stop reason: HOSPADM

## 2021-04-16 RX ORDER — HYDROMORPHONE HCL 110MG/55ML
0.5 PATIENT CONTROLLED ANALGESIA SYRINGE INTRAVENOUS AS NEEDED
Status: DISCONTINUED | OUTPATIENT
Start: 2021-04-16 | End: 2021-04-16 | Stop reason: HOSPADM

## 2021-04-16 RX ORDER — HYDROMORPHONE HCL 110MG/55ML
1 PATIENT CONTROLLED ANALGESIA SYRINGE INTRAVENOUS AS NEEDED
Status: DISCONTINUED | OUTPATIENT
Start: 2021-04-16 | End: 2021-04-16 | Stop reason: HOSPADM

## 2021-04-16 RX ORDER — NALOXONE HCL 0.4 MG/ML
0.4 VIAL (ML) INJECTION AS NEEDED
Status: DISCONTINUED | OUTPATIENT
Start: 2021-04-16 | End: 2021-04-16 | Stop reason: HOSPADM

## 2021-04-16 RX ORDER — ONDANSETRON 2 MG/ML
4 INJECTION INTRAMUSCULAR; INTRAVENOUS ONCE AS NEEDED
Status: DISCONTINUED | OUTPATIENT
Start: 2021-04-16 | End: 2021-04-16 | Stop reason: HOSPADM

## 2021-04-16 RX ORDER — HYDROCODONE BITARTRATE AND ACETAMINOPHEN 7.5; 325 MG/1; MG/1
1 TABLET ORAL EVERY 6 HOURS PRN
Qty: 28 TABLET | Refills: 0 | Status: SHIPPED | OUTPATIENT
Start: 2021-04-16 | End: 2021-04-29

## 2021-04-16 RX ORDER — FENTANYL CITRATE 50 UG/ML
INJECTION, SOLUTION INTRAMUSCULAR; INTRAVENOUS AS NEEDED
Status: DISCONTINUED | OUTPATIENT
Start: 2021-04-16 | End: 2021-04-16 | Stop reason: SURG

## 2021-04-16 RX ORDER — FENTANYL CITRATE 50 UG/ML
50 INJECTION, SOLUTION INTRAMUSCULAR; INTRAVENOUS
Status: DISCONTINUED | OUTPATIENT
Start: 2021-04-16 | End: 2021-04-16 | Stop reason: HOSPADM

## 2021-04-16 RX ORDER — PHENYLEPHRINE HCL IN 0.9% NACL 0.5 MG/5ML
.5-3 SYRINGE (ML) INTRAVENOUS CONTINUOUS PRN
Status: DISCONTINUED | OUTPATIENT
Start: 2021-04-16 | End: 2021-04-16 | Stop reason: HOSPADM

## 2021-04-16 RX ORDER — KETAMINE HYDROCHLORIDE 10 MG/ML
INJECTION INTRAMUSCULAR; INTRAVENOUS AS NEEDED
Status: DISCONTINUED | OUTPATIENT
Start: 2021-04-16 | End: 2021-04-16 | Stop reason: SURG

## 2021-04-16 RX ORDER — PROPOFOL 10 MG/ML
VIAL (ML) INTRAVENOUS AS NEEDED
Status: DISCONTINUED | OUTPATIENT
Start: 2021-04-16 | End: 2021-04-16 | Stop reason: SURG

## 2021-04-16 RX ORDER — SODIUM CHLORIDE 0.9 % (FLUSH) 0.9 %
10 SYRINGE (ML) INJECTION AS NEEDED
Status: DISCONTINUED | OUTPATIENT
Start: 2021-04-16 | End: 2021-04-16 | Stop reason: HOSPADM

## 2021-04-16 RX ORDER — LIDOCAINE HYDROCHLORIDE 10 MG/ML
INJECTION, SOLUTION EPIDURAL; INFILTRATION; INTRACAUDAL; PERINEURAL AS NEEDED
Status: DISCONTINUED | OUTPATIENT
Start: 2021-04-16 | End: 2021-04-16 | Stop reason: SURG

## 2021-04-16 RX ORDER — CEFAZOLIN SODIUM IN 0.9 % NACL 3 G/100 ML
3 INTRAVENOUS SOLUTION, PIGGYBACK (ML) INTRAVENOUS ONCE
Status: DISCONTINUED | OUTPATIENT
Start: 2021-04-16 | End: 2021-04-16 | Stop reason: HOSPADM

## 2021-04-16 RX ORDER — FENTANYL CITRATE 50 UG/ML
INJECTION, SOLUTION INTRAMUSCULAR; INTRAVENOUS
Status: COMPLETED | OUTPATIENT
Start: 2021-04-16 | End: 2021-04-16

## 2021-04-16 RX ADMIN — DEXAMETHASONE SODIUM PHOSPHATE 4 MG: 4 INJECTION, SOLUTION INTRAMUSCULAR; INTRAVENOUS at 07:43

## 2021-04-16 RX ADMIN — HYDROMORPHONE HYDROCHLORIDE 0.2 MG: 2 INJECTION INTRAMUSCULAR; INTRAVENOUS; SUBCUTANEOUS at 08:12

## 2021-04-16 RX ADMIN — SODIUM CHLORIDE, POTASSIUM CHLORIDE, SODIUM LACTATE AND CALCIUM CHLORIDE 9 ML/HR: 600; 310; 30; 20 INJECTION, SOLUTION INTRAVENOUS at 06:36

## 2021-04-16 RX ADMIN — FENTANYL CITRATE 50 MCG: 50 INJECTION, SOLUTION INTRAMUSCULAR; INTRAVENOUS at 07:43

## 2021-04-16 RX ADMIN — PROPOFOL 150 MG: 10 INJECTION, EMULSION INTRAVENOUS at 07:38

## 2021-04-16 RX ADMIN — FENTANYL CITRATE 50 MCG: 50 INJECTION, SOLUTION INTRAMUSCULAR; INTRAVENOUS at 07:53

## 2021-04-16 RX ADMIN — DEXAMETHASONE SODIUM PHOSPHATE 4 MG: 4 INJECTION, SOLUTION INTRAMUSCULAR; INTRAVENOUS at 07:25

## 2021-04-16 RX ADMIN — KETAMINE HYDROCHLORIDE 30 MG: 10 INJECTION, SOLUTION INTRAMUSCULAR; INTRAVENOUS at 07:54

## 2021-04-16 RX ADMIN — CEFAZOLIN SODIUM 2 G: 1 INJECTION, POWDER, FOR SOLUTION INTRAMUSCULAR; INTRAVENOUS at 07:43

## 2021-04-16 RX ADMIN — ONDANSETRON 4 MG: 2 INJECTION INTRAMUSCULAR; INTRAVENOUS at 09:57

## 2021-04-16 RX ADMIN — HYDROMORPHONE HYDROCHLORIDE 0.2 MG: 2 INJECTION INTRAMUSCULAR; INTRAVENOUS; SUBCUTANEOUS at 10:06

## 2021-04-16 RX ADMIN — HYDROMORPHONE HYDROCHLORIDE 0.5 MG: 2 INJECTION, SOLUTION INTRAMUSCULAR; INTRAVENOUS; SUBCUTANEOUS at 10:49

## 2021-04-16 RX ADMIN — HYDROMORPHONE HYDROCHLORIDE 0.2 MG: 2 INJECTION INTRAMUSCULAR; INTRAVENOUS; SUBCUTANEOUS at 10:14

## 2021-04-16 RX ADMIN — FENTANYL CITRATE 100 MCG: 50 INJECTION, SOLUTION INTRAMUSCULAR; INTRAVENOUS at 07:25

## 2021-04-16 RX ADMIN — PROPOFOL 50 MG: 10 INJECTION, EMULSION INTRAVENOUS at 07:53

## 2021-04-16 RX ADMIN — MIDAZOLAM 2 MG: 1 INJECTION INTRAMUSCULAR; INTRAVENOUS at 07:25

## 2021-04-16 RX ADMIN — ROPIVACAINE HYDROCHLORIDE 20 ML: 5 INJECTION, SOLUTION EPIDURAL; INFILTRATION; PERINEURAL at 07:29

## 2021-04-16 RX ADMIN — ROPIVACAINE HYDROCHLORIDE 40 ML: 5 INJECTION, SOLUTION EPIDURAL; INFILTRATION; PERINEURAL at 07:25

## 2021-04-16 RX ADMIN — KETOROLAC TROMETHAMINE 30 MG: 30 INJECTION, SOLUTION INTRAMUSCULAR at 10:19

## 2021-04-16 RX ADMIN — LIDOCAINE HYDROCHLORIDE 100 MG: 10 INJECTION, SOLUTION EPIDURAL; INFILTRATION; INTRACAUDAL; PERINEURAL at 07:38

## 2021-04-16 RX ADMIN — HYDROMORPHONE HYDROCHLORIDE 0.2 MG: 2 INJECTION INTRAMUSCULAR; INTRAVENOUS; SUBCUTANEOUS at 10:02

## 2021-04-16 RX ADMIN — HYDROMORPHONE HYDROCHLORIDE 0.5 MG: 2 INJECTION, SOLUTION INTRAMUSCULAR; INTRAVENOUS; SUBCUTANEOUS at 11:04

## 2021-04-16 RX ADMIN — HYDROMORPHONE HYDROCHLORIDE 0.2 MG: 2 INJECTION INTRAMUSCULAR; INTRAVENOUS; SUBCUTANEOUS at 09:29

## 2021-04-16 RX ADMIN — DEXAMETHASONE SODIUM PHOSPHATE 4 MG: 4 INJECTION, SOLUTION INTRAMUSCULAR; INTRAVENOUS at 07:29

## 2021-04-16 RX ADMIN — HYDROMORPHONE HYDROCHLORIDE 0.2 MG: 2 INJECTION INTRAMUSCULAR; INTRAVENOUS; SUBCUTANEOUS at 08:01

## 2021-04-16 NOTE — OP NOTE
Operative Note   Foot and Ankle Surgery   Provider: Dr. Hector Diego   Location: [facility]      Procedure:  1.  Lateral malleolus excision, right  2.  Open ankle arthrodesis, right  3.  [place] of autograft, right  4.  Tendo Achilles lengthening, right    Pre-operative Diagnosis:   1.  Closed, displaced ankle fracture with malunion, right  2.  Ankle derangement and arthritis, right    Post-operative Diagnosis: Same    Surgeon: Hector Diego    Assistant: Yon Raygoza, PGY 3    Anesthesia: General with block    Implants: 7.0 and 6.5 mm right medical cannulated screws; Augment and AlloMatrix graft    Findings: Significant degenerative changes involving the tibiotalar joint with dislocation    Specimen: None    Blood Loss: Approximately 20 cc    Complications: None    Post Op Plan: Discharge home.  Strict nonweightbearing activity.  Follow-up with me in 2 weeks.    Summary:    Patient is a 60-year-old female who has been seen in office for right foot and ankle pain.  Patient states that the symptoms have been present since an injury last year.  She was not evaluated or treated for that issue and states that the foot has progressively deformed and she complains of substantial pain with any amount of activity.  Imaging was reviewed showing evidence of prior ankle fracture and subsequent malunion with significant degenerative changes involving the tibiotalar joint.  We did review the diagnoses and treatment options.  Options are limited and I do not feel that conservative options will offer any long-term improvement.  We did discuss surgical management including tibiotalar arthrodesis which would require lateral malleolus takedown.  She does understand that she will require several weeks of nonweightbearing after the surgery.  We did review the risks of delayed and nonunion and potential for additional surgeries in the future.  Patient understands and agrees and would like to proceed.    Procedure, risks,  complications, and goals were discussed with the patient at bedside.  Risks include but are not limited to infection, complications from anesthesia (including death), chronic pain or numbness, hematoma/seroma, deep vein thrombosis, wound complications, and potential for additional surgical procedures.  Patient understands and elects to proceed with surgery at this time. Informed consent was obtained before proceeding to the operating suite.  All questions were answered to the patient's satisfaction. No guarantees or assurances were given or implied.    Procedure:    Patient brought to the operating room placed on operative table in supine position.  Once adequate general anesthesia was administered, a pneumatic tourniquet was placed about the patient's right thigh.  The right lower extremity was scrubbed prepped and draped in usual sterile fashion.  The limb was elevated and exsanguinated and pneumatic tourniquet was inflated to 300 mmHg.  A formal timeout was conducted prior to incision.    Attention was then directed to the lateral aspect of the ankle.  Expansile incision was performed to the distal midline of the fibula extending onto the lateral aspect of the foot.  Incision was performed in layers to the level of bone.  Vital structures were identified and preserved.  Bleeders are cauterized as needed.  The previous fracture was identified with heterotopic bone formation.  A sagittal saw was then used to resect at the level of the fracture site from a proximal lateral to distal medial orientation.  Dissection was continued along the anterior distal and posterior aspects of the lateral malleolus until it was fully excised.  The bone was placed on the back table to use for autograft later in the procedure.    C-arm fluoroscopy was used to evaluate reduction of the ankle joint which showed continued gapping involving the medial gutter.  The decision was made to make a medial incision.  This was performed over the  medial malleolus and extending distally over the deltoid.  Incision was performed to bone.  The saphenous vein was retracted throughout the duration of the procedure.  A linear longitudinal incision was performed through the deltoid ligament.  A rongeur was used to remove all fibrotic tissue from the medial gutter.  Again, reduction was attempted under fluoroscopy showing adequate reduction in the AP plane however anterior subluxation on the sagittal.  Decision was made to proceed with tendo Achilles lengthening.  This was performed through 3 stab incisions performed 2 cm apart.  Two medial and 1 lateral incision was made.  Adequate release was achieved and finally reduction was achieved to the tibiotalar joint in AP and lateral orientation.    Joint prep was performed with curettes and rongeurs removing all articular cartilage from the joint site.  The joint was flushed with copious amounts of normal saline.  Fenestration of the talus and tibia was performed with a 2-0 drill bit and fish scaling with osteotomes.  Augment and 5 cc of AlloMatrix graft were combined and applied to the tibiotalar joint.  Temporary fixation was achieved under fluoroscopic guidance.  Autograft from the lateral malleolus was achieved by splitting the bone on the back table and curetting out the cancellous bone.  The bone was then used to fill all voids to the anterior and medial gutter of the tibiotalar joint.    There was difficulty with screw fixation given the size of the screws and location.  Two screw construct was achieved.  A guidewire was placed from a medial proximal to distal lateral orientation across the tibiotalar joint.  Definitive fixation was achieved utilizing a 7.0mm cannulated screw.  Rigidity was noted across the tibiotalar joint.  A secondary 6.5 mm screw was then performed from a posterior to anterior orientation.  A third screw was not able to be used due to difficulty with the screw placement.  The tibiotalar joint  fusion site is stable and rectus in alignment.  Final imaging was performed.  Wounds were irrigated with copious amounts of normal saline.  The deltoid ligament was reconstructed utilizing a 2-0 Ethibond in a simple interrupted manner.  Deep structures were closed with a 2-0 Vicryl in simple interrupted manner.  Subcutaneous tissues were closed with 4-0 Vicryl and skin was closed with staples.  The incisions were dressed with Xeroform and sterile compressive dressings.  The tourniquet was released and a prompt hyperemic response was noted to all digits of the right lower extremity.  The limb was placed into a well-padded bivalved cast.  Patient tolerated the procedure and anesthesia well.  She was transferred from the operating room to the recovery room with vital signs stable and neurovascular is unchanged to the right lower extremity.      Dr. Hector Diego DPM  [facility]  [phone]    Note is dictated utilizing voice recognition software. Unfortunately this leads to occasional typographical errors. I apologize in advance if the situation occurs. If questions occur please do not hesitate to call our office.

## 2021-04-16 NOTE — ANESTHESIA PREPROCEDURE EVALUATION
Anesthesia Evaluation     Patient summary reviewed and Nursing notes reviewed   NPO Solid Status: > 8 hours  NPO Liquid Status: > 8 hours           Airway   Mallampati: II  TM distance: >3 FB  Neck ROM: full  No difficulty expected  Dental - normal exam     Pulmonary    Cardiovascular         Neuro/Psych  GI/Hepatic/Renal/Endo    (+) obesity,  GERD,      Musculoskeletal     Abdominal    Substance History      OB/GYN          Other   arthritis,                      Anesthesia Plan    ASA 2     general with block     intravenous induction     Anesthetic plan, all risks, benefits, and alternatives have been provided, discussed and informed consent has been obtained with: patient.    Plan discussed with CRNA and CAA.

## 2021-04-16 NOTE — ANESTHESIA PROCEDURE NOTES
Peripheral Block    Pre-sedation assessment completed: 4/16/2021 7:17 AM    Patient reassessed immediately prior to procedure    Patient location during procedure: pre-op  Start time: 4/16/2021 7:17 AM  Stop time: 4/16/2021 7:25 AM  Reason for block: at surgeon's request and post-op pain management  Performed by  Anesthesiologist: Wyatt Ann MD  Preanesthetic Checklist  Completed: patient identified, IV checked, site marked, risks and benefits discussed, surgical consent, monitors and equipment checked, pre-op evaluation and timeout performed  Prep:  Pt Position: supine  Sterile barriers:cap, gloves, mask and washed/disinfected hands  Prep: ChloraPrep  Patient monitoring: blood pressure monitoring, continuous pulse oximetry and EKG  Procedure  Sedation:yes  Performed under: MAC  Guidance:ultrasound guided  ULTRASOUND INTERPRETATION. Using ultrasound guidance a 21 G gauge needle was placed in close proximity to the nerve, at which point, under ultrasound guidance anesthetic was injected in the area of the nerve and spread of the anesthesia was seen on ultrasound in close proximity thereto.  There were no abnormalities seen on ultrasound; a digital image was taken; and the patient tolerated the procedure with no complications. Images:still images obtained, printed/placed on chart    Laterality:right  Block Type:popliteal  Injection Technique:single-shot  Needle Type:echogenic  Needle Gauge:21 G    Sedation medications used: midazolam (VERSED) injection, 2 mg  fentaNYL citrate (PF) (SUBLIMAZE) injection, 100 mcg  Medications Used: dexamethasone (DECADRON) injection, 4 mg  ropivacaine (NAROPIN) 0.5 % injection, 40 mL  Med admintered at 4/16/2021 7:25 AM      Post Assessment  Injection Assessment: negative aspiration for heme, no paresthesia on injection and incremental injection  Patient Tolerance:comfortable throughout block  Complications:no

## 2021-04-16 NOTE — ANESTHESIA POSTPROCEDURE EVALUATION
Patient: Ophelia Funez    Procedure Summary     Date: 04/16/21 Room / Location: [facility] OR 09 / [facility] OR    Anesthesia Start: 0732 Anesthesia Stop: 1028    Procedures:       ANKLE ARTHRODESIS with lateral malleolus takedown, with bone autograft (Right Ankle)      ACHILLES TENDON LENGTHENING (Right Leg Lower) Diagnosis:       Post-traumatic osteoarthritis of right ankle      Displaced bimalleolar fracture, right, closed, with malunion, subsequent encounter      (Post-traumatic osteoarthritis of right ankle [M19.171])      (Displaced bimalleolar fracture, right, closed, with malunion, subsequent encounter [S82.841P])    Surgeons: EDITA Diego DPM Provider: Wyatt Ann MD    Anesthesia Type: general with block ASA Status: 2          Anesthesia Type: general with block    Vitals  Vitals Value Taken Time   /70 04/16/21 1134   Temp 97.8 °F (36.6 °C) 04/16/21 1127   Pulse 91 04/16/21 1134   Resp 12 04/16/21 1127   SpO2 100 % 04/16/21 1134   Vitals shown include unvalidated device data.        Post Anesthesia Care and Evaluation    Patient location during evaluation: PACU  Patient participation: complete - patient participated  Level of consciousness: awake  Pain scale: See nurse's notes for pain score.  Pain management: adequate  Airway patency: patent  Anesthetic complications: No anesthetic complications  PONV Status: none  Cardiovascular status: acceptable  Respiratory status: acceptable  Hydration status: acceptable    Comments: Patient seen and examined postoperatively; vital signs stable; SpO2 greater than or equal to 90%; cardiopulmonary status stable; nausea/vomiting adequately controlled; pain adequately controlled; no apparent anesthesia complications; patient discharged from anesthesia care when discharge criteria were met

## 2021-04-16 NOTE — ANESTHESIA PROCEDURE NOTES
Airway  Urgency: elective    Date/Time: 4/16/2021 7:39 AM  Airway not difficult    General Information and Staff    Patient location during procedure: OR  Anesthesiologist: Wyatt Ann MD  CRNA: Ariana Cho AA    Indications and Patient Condition  Indications for airway management: airway protection    Preoxygenated: yes  MILS maintained throughout  Mask difficulty assessment: 0 - not attempted    Final Airway Details  Final airway type: supraglottic airway      Successful airway: unique and LMA  Size 4    Number of attempts at approach: 1  Assessment: lips, teeth, and gum same as pre-op and atraumatic intubation    Additional Comments  Placed by CADENCE Draper

## 2021-04-16 NOTE — ANESTHESIA PROCEDURE NOTES
Peripheral Block    Pre-sedation assessment completed: 4/16/2021 7:29 AM    Patient reassessed immediately prior to procedure    Patient location during procedure: OR  Start time: 4/16/2021 7:25 AM  Stop time: 4/16/2021 7:29 AM  Reason for block: at surgeon's request and post-op pain management  Performed by  Anesthesiologist: Wyatt Ann MD  Preanesthetic Checklist  Completed: patient identified, IV checked, site marked, risks and benefits discussed, surgical consent, monitors and equipment checked, pre-op evaluation and timeout performed  Prep:  Pt Position: supine  Sterile barriers:cap, gloves, mask and washed/disinfected hands  Prep: ChloraPrep  Patient monitoring: blood pressure monitoring, continuous pulse oximetry and EKG  Procedure  Performed under: MAC  Guidance:ultrasound guided  ULTRASOUND INTERPRETATION. Using ultrasound guidance a 21 G gauge needle was placed in close proximity to the nerve, at which point, under ultrasound guidance anesthetic was injected in the area of the nerve and spread of the anesthesia was seen on ultrasound in close proximity thereto.  There were no abnormalities seen on ultrasound; a digital image was taken; and the patient tolerated the procedure with no complications. Images:still images obtained, printed/placed on chart    Laterality:right  Block Type:adductor canal block  Injection Technique:single-shot  Needle Type:echogenic  Needle Gauge:21 G      Medications Used: dexamethasone (DECADRON) injection, 4 mg  ropivacaine (NAROPIN) 0.5 % injection, 20 mL  Med admintered at 4/16/2021 7:29 AM      Post Assessment  Injection Assessment: negative aspiration for heme, no paresthesia on injection and incremental injection  Patient Tolerance:comfortable throughout block  Complications:no

## 2021-04-17 LAB — QT INTERVAL: 330 MS

## 2021-04-29 ENCOUNTER — OFFICE VISIT (OUTPATIENT)
Dept: PODIATRY | Facility: CLINIC | Age: 61
End: 2021-04-29

## 2021-04-29 VITALS
DIASTOLIC BLOOD PRESSURE: 86 MMHG | SYSTOLIC BLOOD PRESSURE: 142 MMHG | BODY MASS INDEX: 36.73 KG/M2 | HEIGHT: 69 IN | HEART RATE: 114 BPM | WEIGHT: 248 LBS

## 2021-04-29 DIAGNOSIS — M19.171 POST-TRAUMATIC OSTEOARTHRITIS OF RIGHT ANKLE: Primary | ICD-10-CM

## 2021-04-29 PROCEDURE — 99024 POSTOP FOLLOW-UP VISIT: CPT | Performed by: PODIATRIST

## 2021-04-29 NOTE — PROGRESS NOTES
"2021  Foot and Ankle Surgery - Established Patient/Follow-up  Provider: Dr. Hector Diego DPM  Location: [facility]    Subjective:  Ophelia Funez is a 60 y.o. female.     Chief Complaint   Patient presents with   • Right Ankle - Follow-up, Post-op       HPI: Patient is a 60-year-old female that returns 2 weeks after ankle arthrodesis.  She states that she is doing well and has remained off weightbearing.  No other issues today    Allergies   Allergen Reactions   • Codeine Palpitations and Rash       Current Outpatient Medications on File Prior to Visit   Medication Sig Dispense Refill   • Diclofenac Sodium (Pennsaid) 2 % solution Apply 40 mg topically 2 (two) times a day. 112 g 3   • multivitamin with minerals (MULTIVITAMIN ADULT PO) Take 1 tablet by mouth Daily.     • omeprazole (priLOSEC) 40 MG capsule Take 40 mg by mouth Daily.     • [DISCONTINUED] HYDROcodone-acetaminophen (NORCO) 7.5-325 MG per tablet Take 1 tablet by mouth Every 6 (Six) Hours As Needed for Moderate Pain  (Pain). 28 tablet 0     No current facility-administered medications on file prior to visit.       Objective   /86   Pulse 114   Ht 175.3 cm (69\")   Wt 112 kg (248 lb)   BMI 36.62 kg/m²     General:   Appearance: appears stated age and healthy    Orientation: AAOx3    Affect: appropriate    Gait: antalgic    Gait comment:  Significant pronation and forefoot abduction with stance and gait     VASCULAR       Right Foot Vascularity   Normal vascular exam    Dorsalis pedis:  2+  Posterior tibial:  2+  Skin Temperature: warm    Edema Gradin+ and pitting  CFT:  < 3 seconds  Pedal Hair Growth:  Absent      NEUROLOGIC      Right Foot Neurologic   Light touch sensation:  Normal  Hot/Cold sensation: normal    Protective Sensation using [place] Monofilament:  10  Achilles reflex:  2+      MUSCULOSKELETAL       Right Foot Musculoskeletal   Ecchymosis:  None  Tenderness: medial malleolus, lateral malleolus, " subtalar joint, posterior tibial tendon and anterior tibiotalar joint line    Arch:  Pes planus  Hammertoe:  Second toe, third toe, fourth toe and fifth toe  Hallux valgus: Yes (Severe deformity)        MUSCLE STRENGTH      Right Foot Muscle Strength   Foot dorsiflexion:  4+  Foot plantar flexion:  4+  Foot inversion:  4+  Foot eversion:  4+      DERMATOLOGIC      Right Foot Dermatologic   Skin: Incision sites are dry and stable with intact staples.  No evidence of dehiscence or infection      Right Foot Additional Comments: Rectus alignment of the ankle.    Assessment/Plan   Diagnoses and all orders for this visit:    1. Post-traumatic osteoarthritis of right ankle (Primary)      Patient is doing quite well.  Staples were removed today without issue.  She has no complicating features or signs of infection.  I have recommended that we proceed with strict nonweightbearing activities.  I would like her to continue using the knee scooter.  I have dispensed a cam boot and asked that she wear the boot for added protection with any assisted ambulation.  Patient may shower and bathe as needed.  We did discuss gentle range of motion exercises and manual therapy.  I would like her to return in 4 weeks for reevaluation and imaging.    No orders of the defined types were placed in this encounter.         Note is dictated utilizing voice recognition software. Unfortunately this leads to occasional typographical errors. I apologize in advance if the situation occurs. If questions occur please do not hesitate to call our office.

## 2021-05-03 DIAGNOSIS — M25.562 ACUTE PAIN OF LEFT KNEE: ICD-10-CM

## 2021-05-04 RX ORDER — MELOXICAM 15 MG/1
TABLET ORAL
Qty: 30 TABLET | Refills: 0 | Status: SHIPPED | OUTPATIENT
Start: 2021-05-04 | End: 2021-08-13

## 2021-05-27 ENCOUNTER — OFFICE VISIT (OUTPATIENT)
Dept: PODIATRY | Facility: CLINIC | Age: 61
End: 2021-05-27

## 2021-05-27 VITALS
DIASTOLIC BLOOD PRESSURE: 84 MMHG | HEART RATE: 112 BPM | BODY MASS INDEX: 36.73 KG/M2 | WEIGHT: 248 LBS | SYSTOLIC BLOOD PRESSURE: 138 MMHG | HEIGHT: 69 IN

## 2021-05-27 DIAGNOSIS — M19.171 POST-TRAUMATIC OSTEOARTHRITIS OF RIGHT ANKLE: Primary | ICD-10-CM

## 2021-05-27 PROCEDURE — 99024 POSTOP FOLLOW-UP VISIT: CPT | Performed by: PODIATRIST

## 2021-05-31 NOTE — PROGRESS NOTES
"2021  Foot and Ankle Surgery - Established Patient/Follow-up  Provider: Dr. Hector Diego DPM  Location: [facility]    Subjective:  Ophelia Funez is a 61 y.o. female.     Chief Complaint   Patient presents with   • Right Foot - Follow-up       HPI: Patient returns approximately 6 weeks after surgery.  She is doing well.  She has not had any substantial pain and has been able to remain off weightbearing.    Allergies   Allergen Reactions   • Codeine Palpitations and Rash       Current Outpatient Medications on File Prior to Visit   Medication Sig Dispense Refill   • meloxicam (MOBIC) 15 MG tablet TAKE 1 TABLET BY MOUTH EVERY DAY 30 tablet 0   • multivitamin with minerals (MULTIVITAMIN ADULT PO) Take 1 tablet by mouth Daily.     • omeprazole (priLOSEC) 40 MG capsule Take 40 mg by mouth Daily.     • Diclofenac Sodium (Pennsaid) 2 % solution Apply 40 mg topically 2 (two) times a day. 112 g 3     No current facility-administered medications on file prior to visit.       Objective   /84   Pulse 112   Ht 175.3 cm (69\")   Wt 112 kg (248 lb)   BMI 36.62 kg/m²     General:   Appearance: appears stated age and healthy    Orientation: AAOx3    Affect: appropriate    Gait: antalgic    Gait comment:  Significant pronation and forefoot abduction with stance and gait     VASCULAR       Right Foot Vascularity   Normal vascular exam    Dorsalis pedis:  2+  Posterior tibial:  2+  Skin Temperature: warm    Edema Gradin+ and pitting  CFT:  < 3 seconds  Pedal Hair Growth:  Absent      NEUROLOGIC      Right Foot Neurologic   Light touch sensation:  Normal  Hot/Cold sensation: normal    Protective Sensation using [place] Monofilament:  10  Achilles reflex:  2+      MUSCULOSKELETAL       Right Foot Musculoskeletal   Ecchymosis:  None  Tenderness: Mild discomfort to operative sites  Arch:  Pes planus  Hammertoe:  Second toe, third toe, fourth toe and fifth toe  Hallux valgus: Yes (Severe " deformity)        MUSCLE STRENGTH      Right Foot Muscle Strength   Foot dorsiflexion:  4+  Foot plantar flexion:  4+  Foot inversion:  4+  Foot eversion:  4+      DERMATOLOGIC      Right Foot Dermatologic   Skin: Incision sites are well-healed.    Assessment/Plan   Diagnoses and all orders for this visit:    1. Post-traumatic osteoarthritis of right ankle (Primary)    Other orders  -     Cancel: XR Foot 3+ View Right      Patient has noticed significant improvement since last exam.  She has remained off weightbearing.  Imaging was obtained and independently reviewed today showing stable internal fixation with incomplete arthrodesis of the tibiotalar joint.  At this time, I have not recommended that she proceed with partial protected weightbearing with the cam boot and crutch assistance as needed.  She is to avoid high-impact and excessive activities.  I would like to see her in 4 weeks for reevaluation and imaging    No orders of the defined types were placed in this encounter.         Note is dictated utilizing voice recognition software. Unfortunately this leads to occasional typographical errors. I apologize in advance if the situation occurs. If questions occur please do not hesitate to call our office.

## 2021-06-24 ENCOUNTER — OFFICE VISIT (OUTPATIENT)
Dept: PODIATRY | Facility: CLINIC | Age: 61
End: 2021-06-24

## 2021-06-24 VITALS
HEART RATE: 108 BPM | WEIGHT: 248 LBS | DIASTOLIC BLOOD PRESSURE: 87 MMHG | BODY MASS INDEX: 36.73 KG/M2 | SYSTOLIC BLOOD PRESSURE: 129 MMHG | HEIGHT: 69 IN

## 2021-06-24 DIAGNOSIS — M19.171 POST-TRAUMATIC OSTEOARTHRITIS OF RIGHT ANKLE: Primary | ICD-10-CM

## 2021-06-24 PROCEDURE — 99024 POSTOP FOLLOW-UP VISIT: CPT | Performed by: PODIATRIST

## 2021-06-24 NOTE — PROGRESS NOTES
"2021  Foot and Ankle Surgery - Established Patient/Follow-up  Provider: Dr. Hector Diego DPM  Location: [facility]    Subjective:  Ophelia Funez is a 61 y.o. female.     Chief Complaint   Patient presents with   • Right Ankle - Follow-up, Post-op       HPI: Patient returns approximately 10 weeks after surgery.  She states that she is doing well.  She continues to use the knee scooter.  She has been placing some weight in the cam boot.  No other issues at this time    Allergies   Allergen Reactions   • Codeine Palpitations and Rash       Current Outpatient Medications on File Prior to Visit   Medication Sig Dispense Refill   • Diclofenac Sodium (Pennsaid) 2 % solution Apply 40 mg topically 2 (two) times a day. 112 g 3   • meloxicam (MOBIC) 15 MG tablet TAKE 1 TABLET BY MOUTH EVERY DAY 30 tablet 0   • multivitamin with minerals (MULTIVITAMIN ADULT PO) Take 1 tablet by mouth Daily.     • omeprazole (priLOSEC) 40 MG capsule Take 40 mg by mouth Daily.       No current facility-administered medications on file prior to visit.       Objective   /87   Pulse 108   Ht 175.3 cm (69\")   Wt 112 kg (248 lb)   BMI 36.62 kg/m²     General:   Appearance: appears stated age and healthy    Orientation: AAOx3    Affect: appropriate    Gait: antalgic    Gait comment:  Significant pronation and forefoot abduction with stance and gait     VASCULAR       Right Foot Vascularity   Normal vascular exam    Dorsalis pedis:  2+  Posterior tibial:  2+  Skin Temperature: warm    Edema Gradin+ and pitting  CFT:  < 3 seconds  Pedal Hair Growth:  Absent      NEUROLOGIC      Right Foot Neurologic   Light touch sensation:  Normal  Hot/Cold sensation: normal    Protective Sensation using [place]-Herman Monofilament:  10  Achilles reflex:  2+      MUSCULOSKELETAL       Right Foot Musculoskeletal   Ecchymosis:  None  Tenderness: Mild discomfort to operative sites  Arch:  Pes planus  Hammertoe:  Second toe, third " toe, fourth toe and fifth toe  Hallux valgus: Yes (Severe deformity)        MUSCLE STRENGTH      Right Foot Muscle Strength   Foot dorsiflexion:  4+  Foot plantar flexion:  4+  Foot inversion:  4+  Foot eversion:  4+      DERMATOLOGIC      Right Foot Dermatologic   Skin: Incision sites are well-healed.    Assessment/Plan   Diagnoses and all orders for this visit:    1. Post-traumatic osteoarthritis of right ankle (Primary)  -     XR Ankle 3+ View Right      Patient is doing quite well at this time.  Imaging was performed showing interval healing involving the tibiotalar joint.  Stable internal fixation is noted.  No other issues at this time.  I have asked that she start partial protected weightbearing in the cam boot with crutch or walker assistance as needed.  Patient is to gradually discontinue the knee scooter.  She is to call with any additional issues or concerns.  I have recommended that she follow-up in 4 weeks for reevaluation and imaging    Orders Placed This Encounter   Procedures   • XR Ankle 3+ View Right     Order Specific Question:   Reason for Exam:     Answer:   Right ankle post op f/U RM 15 WB     Order Specific Question:   Release to patient     Answer:   Immediate          Note is dictated utilizing voice recognition software. Unfortunately this leads to occasional typographical errors. I apologize in advance if the situation occurs. If questions occur please do not hesitate to call our office.

## 2021-06-30 ENCOUNTER — TELEPHONE (OUTPATIENT)
Dept: ORTHOPEDIC SURGERY | Facility: CLINIC | Age: 61
End: 2021-06-30

## 2021-06-30 NOTE — TELEPHONE ENCOUNTER
Caller: RONALD FERGUSON     Relationship: SELF     Best call back number: [phone]    What was the call regarding: PATIENT NEEDS WORK RELEASE FAXED TO HER SUPERVISOR , JONH PARIS- [phone]- asap- PLEASE CALL PATIENT BACK TO CONFIRM FAX HAS BEEN SENT OR ANY QUESTIONS     Do you require a callback: YES

## 2021-07-29 ENCOUNTER — OFFICE VISIT (OUTPATIENT)
Dept: PODIATRY | Facility: CLINIC | Age: 61
End: 2021-07-29

## 2021-07-29 VITALS
SYSTOLIC BLOOD PRESSURE: 139 MMHG | WEIGHT: 248 LBS | HEART RATE: 89 BPM | HEIGHT: 69 IN | DIASTOLIC BLOOD PRESSURE: 85 MMHG | BODY MASS INDEX: 36.73 KG/M2

## 2021-07-29 DIAGNOSIS — M96.0 NONUNION AFTER ARTHRODESIS: Primary | ICD-10-CM

## 2021-07-29 DIAGNOSIS — M19.171 POST-TRAUMATIC OSTEOARTHRITIS OF RIGHT ANKLE: ICD-10-CM

## 2021-07-29 PROCEDURE — 99213 OFFICE O/P EST LOW 20 MIN: CPT | Performed by: PODIATRIST

## 2021-07-30 NOTE — PROGRESS NOTES
"2021  Foot and Ankle Surgery - Established Patient/Follow-up  Provider: Dr. Hector Diego DPM  Location: [facility]    Subjective:  Ophelia Funez is a 61 y.o. female.     Chief Complaint   Patient presents with   • Right Ankle - Follow-up       HPI: Patient returns for follow-up regarding her right ankle.  She states that she has been doing well and continues to wear the cam boot.  She is very anxious to return to her regular shoe and normal activity.    Allergies   Allergen Reactions   • Codeine Palpitations and Rash       Current Outpatient Medications on File Prior to Visit   Medication Sig Dispense Refill   • Diclofenac Sodium (Pennsaid) 2 % solution Apply 40 mg topically 2 (two) times a day. 112 g 3   • meloxicam (MOBIC) 15 MG tablet TAKE 1 TABLET BY MOUTH EVERY DAY 30 tablet 0   • multivitamin with minerals (MULTIVITAMIN ADULT PO) Take 1 tablet by mouth Daily.     • omeprazole (priLOSEC) 40 MG capsule Take 40 mg by mouth Daily.       No current facility-administered medications on file prior to visit.       Objective   /85   Pulse 89   Ht 175.3 cm (69\")   Wt 112 kg (248 lb)   BMI 36.62 kg/m²     General:   Appearance: appears stated age and healthy    Orientation: AAOx3    Affect: appropriate    Gait: antalgic    Gait comment:  Significant pronation and forefoot abduction with stance and gait     VASCULAR       Right Foot Vascularity   Normal vascular exam    Dorsalis pedis:  2+  Posterior tibial:  2+  Skin Temperature: warm    Edema Gradin+ and pitting  CFT:  < 3 seconds  Pedal Hair Growth:  Absent      NEUROLOGIC      Right Foot Neurologic   Light touch sensation:  Normal  Hot/Cold sensation: normal    Protective Sensation using [place]-Herman Monofilament:  10  Achilles reflex:  2+      MUSCULOSKELETAL       Right Foot Musculoskeletal   Ecchymosis:  None  Tenderness: Mild discomfort to operative sites  Arch:  Pes planus  Hammertoe:  Second toe, third toe, fourth " toe and fifth toe  Hallux valgus: Yes (Severe deformity)        MUSCLE STRENGTH      Right Foot Muscle Strength   Foot dorsiflexion:  4+  Foot plantar flexion:  4+  Foot inversion:  4+  Foot eversion:  4+      DERMATOLOGIC      Right Foot Dermatologic   Skin: Incision sites are well-healed.   General:   Appearance: appears stated age and healthy    Orientation: AAOx3    Affect: appropriate    Gait: antalgic    Gait comment:  Significant pronation and forefoot abduction with stance and gait     VASCULAR       Right Foot Vascularity   Normal vascular exam    Dorsalis pedis:  2+  Posterior tibial:  2+  Skin Temperature: warm    Edema Gradin+ and pitting  CFT:  < 3 seconds  Pedal Hair Growth:  Absent      NEUROLOGIC      Right Foot Neurologic   Light touch sensation:  Normal  Hot/Cold sensation: normal    Protective Sensation using [place]-Herman Monofilament:  10  Achilles reflex:  2+      MUSCULOSKELETAL       Right Foot Musculoskeletal   Ecchymosis:  None  Tenderness: Mild discomfort to operative sites  Arch:  Pes planus  Hammertoe:  Second toe, third toe, fourth toe and fifth toe  Hallux valgus: Yes (Severe deformity)        MUSCLE STRENGTH      Right Foot Muscle Strength   Foot dorsiflexion:  4+  Foot plantar flexion:  4+  Foot inversion:  4+  Foot eversion:  4+      DERMATOLOGIC      Right Foot Dermatologic   Skin: Incision sites are well-healed.       Assessment/Plan   Diagnoses and all orders for this visit:    1. 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 Post-traumatic osteoarthritis of right ankle  -     XR Ankle 3+ View Right      Patient returns and appears to be doing quite well.  Imaging was obtained and independently reviewed today showing continued nonunion involving the fusion site measuring less than 1cm.  Hardware is intact and stable.  I have recommended that she continue to remain in the cam boot for the next 2 weeks and then gradually try to return to her regular shoe.  She is to progress  activity as tolerated and avoid high-impact and excessive activity.  I do feel that she would benefit from an Exogen bone stimulator.  Given the continued nonunion.  Patient understands and agrees.  We did discuss proper use and effects.  Patient is to call with any additional issues or concerns.  I would like to see her in 2 months for reevaluation.  Greater than 20 minutes was spent before, during, and after evaluation for patient care.    Orders Placed This Encounter   Procedures   • XR Ankle 3+ View Right     Order Specific Question:   Reason for Exam:     Answer:   Right ankle post op F/U RM 14 WB     Order Specific Question:   Does this patient have a diabetic monitoring/medication delivering device on?     Answer:   No     Order Specific Question:   Release to patient     Answer:   Immediate          Note is dictated utilizing voice recognition software. Unfortunately this leads to occasional typographical errors. I apologize in advance if the situation occurs. If questions occur please do not hesitate to call our office.

## 2021-08-04 ENCOUNTER — TELEPHONE (OUTPATIENT)
Dept: PODIATRY | Facility: CLINIC | Age: 61
End: 2021-08-04

## 2021-08-04 DIAGNOSIS — M19.171 POST-TRAUMATIC OSTEOARTHRITIS OF RIGHT ANKLE: ICD-10-CM

## 2021-08-04 DIAGNOSIS — M96.0 NONUNION AFTER ARTHRODESIS: Primary | ICD-10-CM

## 2021-08-04 NOTE — TELEPHONE ENCOUNTER
CHART INFORMATION WAS FAXED INTO [employer] FOR [employer] BONE STIMULATOR.  I RECEIVED SUCCESS PAGE FROM FAX MACHINE.

## 2021-08-11 DIAGNOSIS — M25.562 ACUTE PAIN OF LEFT KNEE: ICD-10-CM

## 2021-08-13 RX ORDER — MELOXICAM 15 MG/1
TABLET ORAL
Qty: 30 TABLET | Refills: 0 | Status: SHIPPED | OUTPATIENT
Start: 2021-08-13 | End: 2021-10-05

## 2021-08-16 ENCOUNTER — TELEPHONE (OUTPATIENT)
Dept: PODIATRY | Facility: CLINIC | Age: 61
End: 2021-08-16

## 2021-08-16 ENCOUNTER — TELEPHONE (OUTPATIENT)
Dept: ORTHOPEDIC SURGERY | Facility: CLINIC | Age: 61
End: 2021-08-16

## 2021-08-16 NOTE — TELEPHONE ENCOUNTER
"Caller: [email or web address]      Best call back number: [phone]  What orders are you requesting (i.e. lab or imaging): ORDER FOR [employer]EN BONE STIMULATOR    Additional notes: HENNY CALLED REQUESTING AN ADDENDUM TO THE ORDER FOR AN EXOGEN BONE STIMULATOR  NEEDS \"FX GAP <1CM\" ADDED TO THE ORDER AND INITIALED AND DATED BY THE PHYSICIAN  PLEASE FAX TO: [phone]  ORDER WAS ORIGINALLY SENT BY [employer]"

## 2021-08-16 NOTE — TELEPHONE ENCOUNTER
HENNY WITH [employer] EXOGEN BONE STIMULATOR CALLED OFFICE STATING THEY NEED SOMETHING CHANGED IN ORDER FOR PATIENT'S INSURANCE TO COVER BONE STIMULATOR.  THE FUSION SITE NEEDS TO MEASURE LESS THAN 1 CM IN ORDER FOR INSURANCE TO COVER THIS UNIT.  THE PATIENT ALREADY HAS THE UNIT THEY JUST NEED THIS CHANGED IN ORDER TO GET IT COMPLETELY AUTHORIZED.  DR MO CAN SEND ON A SCRIPT PAD THAT FUSION SITE IS MEASURING LESS THAN 1 CM.  WE CAN FAX SCRIPT [phone].

## 2021-09-30 ENCOUNTER — OFFICE VISIT (OUTPATIENT)
Dept: PODIATRY | Facility: CLINIC | Age: 61
End: 2021-09-30

## 2021-09-30 VITALS
DIASTOLIC BLOOD PRESSURE: 102 MMHG | WEIGHT: 248 LBS | HEIGHT: 69 IN | BODY MASS INDEX: 36.73 KG/M2 | SYSTOLIC BLOOD PRESSURE: 186 MMHG | HEART RATE: 111 BPM

## 2021-09-30 DIAGNOSIS — M19.171 POST-TRAUMATIC OSTEOARTHRITIS OF RIGHT ANKLE: ICD-10-CM

## 2021-09-30 DIAGNOSIS — M96.0 NONUNION AFTER ARTHRODESIS: Primary | ICD-10-CM

## 2021-09-30 PROCEDURE — 99213 OFFICE O/P EST LOW 20 MIN: CPT | Performed by: PODIATRIST

## 2021-10-01 NOTE — PROGRESS NOTES
"2021  Foot and Ankle Surgery - Established Patient/Follow-up  Provider: Dr. Hector Diego DPM  Location: [facility]    Subjective:  Ophelia Funez is a 61 y.o. female.     Chief Complaint   Patient presents with   • Right Ankle - Edema   • Follow-up     last pcp appt 2021       HPI: Patient returns for evaluation of her right ankle.  She has been ambulating in a regular shoe and normal activity.  She states that she is doing well without any significant discomfort.  She does notice mild swelling at the end of the day.  She presents in flip-flops and states that she typically wears sandals, flip-flops, or walks barefoot given that she has mostly at home.    Allergies   Allergen Reactions   • Codeine Palpitations and Rash       Current Outpatient Medications on File Prior to Visit   Medication Sig Dispense Refill   • meloxicam (MOBIC) 15 MG tablet TAKE 1 TABLET BY MOUTH EVERY DAY  30 tablet 0   • multivitamin with minerals (MULTIVITAMIN ADULT PO) Take 1 tablet by mouth Daily.     • omeprazole (priLOSEC) 40 MG capsule Take 40 mg by mouth Daily.     • Diclofenac Sodium (Pennsaid) 2 % solution Apply 40 mg topically 2 (two) times a day. 112 g 3     No current facility-administered medications on file prior to visit.       Objective   BP (!) 186/102   Pulse 111   Ht 175.3 cm (69\")   Wt 112 kg (248 lb)   BMI 36.62 kg/m²     General:   Appearance: appears stated age and healthy    Orientation: AAOx3    Affect: appropriate    Gait: antalgic    Gait comment:  Significant pronation and forefoot abduction with stance and gait     VASCULAR       Right Foot Vascularity   Normal vascular exam    Dorsalis pedis:  2+  Posterior tibial:  2+  Skin Temperature: warm    Edema Gradin+ and pitting  CFT:  < 3 seconds  Pedal Hair Growth:  Absent      NEUROLOGIC      Right Foot Neurologic   Light touch sensation:  Normal  Hot/Cold sensation: normal    Protective Sensation using [place]-Herman " Monofilament:  10  Achilles reflex:  2+      MUSCULOSKELETAL       Right Foot Musculoskeletal   Ecchymosis:  None  Tenderness: Mild discomfort to operative sites  Arch:  Pes planus  Hammertoe:  Second toe, third toe, fourth toe and fifth toe  Hallux valgus: Yes (Severe deformity)        MUSCLE STRENGTH      Right Foot Muscle Strength   Foot dorsiflexion:  4+  Foot plantar flexion:  4+  Foot inversion:  4+  Foot eversion:  4+      DERMATOLOGIC      Right Foot Dermatologic   Skin: Incision sites are well-healed.   General:   Appearance: appears stated age and healthy    Orientation: AAOx3    Affect: appropriate    Gait: antalgic    Gait comment:  Significant pronation and forefoot abduction with stance and gait     VASCULAR       Right Foot Vascularity   Normal vascular exam    Dorsalis pedis:  2+  Posterior tibial:  2+  Skin Temperature: warm    Edema Gradin+ and pitting  CFT:  < 3 seconds  Pedal Hair Growth:  Absent      NEUROLOGIC      Right Foot Neurologic   Light touch sensation:  Normal  Hot/Cold sensation: normal    Protective Sensation using [place]-Herman Monofilament:  10  Achilles reflex:  2+      MUSCULOSKELETAL       Right Foot Musculoskeletal   Ecchymosis:  None  Tenderness: Mild discomfort to operative sites  Arch:  Pes planus  Hammertoe:  Second toe, third toe, fourth toe and fifth toe  Hallux valgus: Yes (Severe deformity)        MUSCLE STRENGTH      Right Foot Muscle Strength   Foot dorsiflexion:  4+  Foot plantar flexion:  4+  Foot inversion:  4+  Foot eversion:  4+      DERMATOLOGIC      Right Foot Dermatologic   Skin: Incision sites are well-healed.    Assessment/Plan   Diagnoses and all orders for this visit:    1. 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 Post-traumatic osteoarthritis of right ankle      Patient's physical exam is relatively unchanged.  She is relatively asymptomatic and does not have any prominent swelling or discomfort to the ankle.   Imaging was obtained and independently reviewed showing stable internal fixation and minimal progressive signs of healing as compared to previous imaging.  I have discussed the importance of proper support and to avoid barefoot and unsupported footwear.  I would like her to continue using the bone stimulator on a daily basis.  She is to call with any additional issues or concerns and I would like to see her in 3 months for reevaluation and imaging of the ankle.  Greater than 20 minutes was spent before, during, and after evaluation for patient care    Orders Placed This Encounter   Procedures   • XR Ankle 3+ View Right     Scheduling Instructions:      Room 13      wb     Order Specific Question:   Reason for Exam:     Answer:   right ankle pain     Order Specific Question:   Does this patient have a diabetic monitoring/medication delivering device on?     Answer:   No     Order Specific Question:   Release to patient     Answer:   Immediate          Note is dictated utilizing voice recognition software. Unfortunately this leads to occasional typographical errors. I apologize in advance if the situation occurs. If questions occur please do not hesitate to call our office.

## 2021-10-04 DIAGNOSIS — M25.562 ACUTE PAIN OF LEFT KNEE: ICD-10-CM

## 2021-10-05 RX ORDER — MELOXICAM 15 MG/1
TABLET ORAL
Qty: 30 TABLET | Refills: 0 | Status: SHIPPED | OUTPATIENT
Start: 2021-10-05 | End: 2022-06-30

## 2021-11-04 DIAGNOSIS — M25.562 ACUTE PAIN OF LEFT KNEE: ICD-10-CM

## 2021-11-05 RX ORDER — MELOXICAM 15 MG/1
TABLET ORAL
Qty: 30 TABLET | Refills: 0 | OUTPATIENT
Start: 2021-11-05

## 2021-11-09 ENCOUNTER — TELEPHONE (OUTPATIENT)
Dept: ORTHOPEDIC SURGERY | Facility: CLINIC | Age: 61
End: 2021-11-09

## 2021-11-09 DIAGNOSIS — M17.12 PRIMARY OSTEOARTHRITIS OF LEFT KNEE: Primary | ICD-10-CM

## 2021-11-09 DIAGNOSIS — M25.562 ACUTE PAIN OF LEFT KNEE: ICD-10-CM

## 2021-11-09 RX ORDER — MELOXICAM 15 MG/1
15 TABLET ORAL DAILY
Qty: 30 TABLET | Refills: 0 | Status: SHIPPED | OUTPATIENT
Start: 2021-11-09 | End: 2022-06-30

## 2021-11-09 NOTE — TELEPHONE ENCOUNTER
meloxicam refill. PT will be seeing PCP to discuss taking over Rx but apt is still 1 month away and requests 1 more rx.

## 2021-12-30 ENCOUNTER — OFFICE VISIT (OUTPATIENT)
Dept: PODIATRY | Facility: CLINIC | Age: 61
End: 2021-12-30

## 2021-12-30 VITALS
HEART RATE: 112 BPM | DIASTOLIC BLOOD PRESSURE: 97 MMHG | WEIGHT: 248 LBS | BODY MASS INDEX: 36.73 KG/M2 | HEIGHT: 69 IN | SYSTOLIC BLOOD PRESSURE: 166 MMHG

## 2021-12-30 DIAGNOSIS — Q66.51 RIGID PES PLANUS OF RIGHT FOOT: ICD-10-CM

## 2021-12-30 DIAGNOSIS — M96.0 NONUNION AFTER ARTHRODESIS: ICD-10-CM

## 2021-12-30 DIAGNOSIS — M20.11 ACQUIRED HALLUX VALGUS, RIGHT: ICD-10-CM

## 2021-12-30 DIAGNOSIS — M25.571 CHRONIC PAIN OF RIGHT ANKLE: Primary | ICD-10-CM

## 2021-12-30 DIAGNOSIS — G89.29 CHRONIC PAIN OF RIGHT ANKLE: Primary | ICD-10-CM

## 2021-12-30 PROCEDURE — 99213 OFFICE O/P EST LOW 20 MIN: CPT | Performed by: PODIATRIST

## 2021-12-31 NOTE — PROGRESS NOTES
"2021  Foot and Ankle Surgery - Established Patient/Follow-up  Provider: Dr. Hector Diego DPM  Location: [facility]    Subjective:  Ophelia Funez is a 61 y.o. female.     Chief Complaint   Patient presents with   • Right Ankle - Pain   • Follow-up     provider name unknown per patient       HPI: Patient returns for follow-up regarding her right lower extremity.  She states that she has been doing quite well.  She has not noticed any significant pain involving the ankle but does complain of intermittent discomfort involving the great toe joint and bunion deformity.  She has been able to perform normal daily activities without any significant limitation.  She is using the bone stimulator on a daily basis.    Allergies   Allergen Reactions   • Codeine Palpitations and Rash       Current Outpatient Medications on File Prior to Visit   Medication Sig Dispense Refill   • multivitamin with minerals (MULTIVITAMIN ADULT PO) Take 1 tablet by mouth Daily.     • omeprazole (priLOSEC) 40 MG capsule Take 40 mg by mouth Daily.     • Diclofenac Sodium (Pennsaid) 2 % solution Apply 40 mg topically 2 (two) times a day. 112 g 3   • meloxicam (MOBIC) 15 MG tablet TAKE 1 TABLET BY MOUTH EVERY DAY  30 tablet 0   • meloxicam (Mobic) 15 MG tablet Take 1 tablet by mouth Daily. 30 tablet 0     No current facility-administered medications on file prior to visit.       Objective   /97   Pulse 112   Ht 175.3 cm (69\")   Wt 112 kg (248 lb)   BMI 36.62 kg/m²        General:   Appearance: appears stated age and healthy    Orientation: AAOx3    Affect: appropriate    Gait: antalgic    Gait comment:  Significant pronation and forefoot abduction with stance and gait     VASCULAR       Right Foot Vascularity   Normal vascular exam    Dorsalis pedis:  2+  Posterior tibial:  2+  Skin Temperature: warm    Edema Gradin+ and pitting  CFT:  < 3 seconds  Pedal Hair Growth:  Absent      NEUROLOGIC      Right Foot " Neurologic   Light touch sensation:  Normal  Hot/Cold sensation: normal    Protective Sensation using [place]-Herman Monofilament:  10  Achilles reflex:  2+      MUSCULOSKELETAL       Right Foot Musculoskeletal   Ecchymosis:  None  Tenderness: Mild discomfort to operative sites  Arch:  Pes planus  Hammertoe:  Second toe, third toe, fourth toe and fifth toe  Hallux valgus: Yes (Severe deformity)        MUSCLE STRENGTH      Right Foot Muscle Strength   Foot dorsiflexion:  4+  Foot plantar flexion:  4+  Foot inversion:  4+  Foot eversion:  4+      DERMATOLOGIC      Right Foot Dermatologic   Skin: Incision sites are well-healed.   General:   Appearance: appears stated age and healthy    Orientation: AAOx3    Affect: appropriate    Gait: antalgic    Gait comment:  Significant pronation and forefoot abduction with stance and gait     VASCULAR       Right Foot Vascularity   Normal vascular exam    Dorsalis pedis:  2+  Posterior tibial:  2+  Skin Temperature: warm    Edema Gradin+ and pitting  CFT:  < 3 seconds  Pedal Hair Growth:  Absent      NEUROLOGIC      Right Foot Neurologic   Light touch sensation:  Normal  Hot/Cold sensation: normal    Protective Sensation using [place]-Herman Monofilament:  10  Achilles reflex:  2+      MUSCULOSKELETAL       Right Foot Musculoskeletal   Ecchymosis:  None  Tenderness: None  Arch:  Pes planus  Hammertoe:  Second toe, third toe, fourth toe and fifth toe  Hallux valgus: Yes (Severe deformity)        MUSCLE STRENGTH      Right Foot Muscle Strength   Foot dorsiflexion:  4+  Foot plantar flexion:  4+  Foot inversion:  4+  Foot eversion:  4+      DERMATOLOGIC      Right Foot Dermatologic   Skin: Incision sites are well-healed.    Assessment/Plan   Diagnoses and all orders for this visit:    1. Chronic pain of right ankle (Primary)  -     XR Ankle 3+ View Right    2. Nonunion after arthrodesis    3. Acquired hallux valgus, right    4. Rigid pes planus of right foot      Patient's  physical exam is unchanged.  She does not have any significant pain involving the ankle.  She continues to have prominent bunion deformity which she does have discomfort intermittently.  She states that symptoms are worse with increased activity and certain shoes.  She continues to have rigid flatfoot deformity.  She has been doing well with a pair of the over-the-counter arch supports.  I have asked that she continue to remain supported and monitor closely.  She is to call with any new issues.  Patient has opted to follow-up in 6 months for routine foot check.  Greater than 20 minutes was spent before, during, and after evaluation for patient care.    Orders Placed This Encounter   Procedures   • XR Ankle 3+ View Right     Nonunion after arthrodesis     Scheduling Instructions:      Room 14      wb+9     Order Specific Question:   Reason for Exam:     Answer:   right ankle pain     Order Specific Question:   Does this patient have a diabetic monitoring/medication delivering device on?     Answer:   No     Order Specific Question:   Release to patient     Answer:   Immediate          Note is dictated utilizing voice recognition software. Unfortunately this leads to occasional typographical errors. I apologize in advance if the situation occurs. If questions occur please do not hesitate to call our office.

## 2022-06-30 ENCOUNTER — OFFICE VISIT (OUTPATIENT)
Dept: PODIATRY | Facility: CLINIC | Age: 62
End: 2022-06-30

## 2022-06-30 VITALS
HEIGHT: 69 IN | HEART RATE: 103 BPM | SYSTOLIC BLOOD PRESSURE: 164 MMHG | DIASTOLIC BLOOD PRESSURE: 101 MMHG | WEIGHT: 246 LBS | BODY MASS INDEX: 36.43 KG/M2

## 2022-06-30 DIAGNOSIS — Q66.51 RIGID PES PLANUS OF RIGHT FOOT: ICD-10-CM

## 2022-06-30 DIAGNOSIS — G89.29 CHRONIC PAIN OF RIGHT ANKLE: Primary | ICD-10-CM

## 2022-06-30 DIAGNOSIS — M20.11 ACQUIRED HALLUX VALGUS, RIGHT: ICD-10-CM

## 2022-06-30 DIAGNOSIS — M96.0 NONUNION AFTER ARTHRODESIS: ICD-10-CM

## 2022-06-30 DIAGNOSIS — M25.571 CHRONIC PAIN OF RIGHT ANKLE: Primary | ICD-10-CM

## 2022-06-30 PROCEDURE — 99212 OFFICE O/P EST SF 10 MIN: CPT | Performed by: PODIATRIST

## 2022-06-30 RX ORDER — IBUPROFEN 600 MG/1
600 TABLET ORAL EVERY 6 HOURS PRN
COMMUNITY
Start: 2022-05-03

## 2022-06-30 NOTE — PROGRESS NOTES
"2022  Foot and Ankle Surgery - Established Patient/Follow-up  Provider: Dr. Hector Diego DPM  Location: [facility]    Subjective:  Ophelia Funez is a 62 y.o. female.     Chief Complaint   Patient presents with   • Right Ankle - Pain, Follow-up     No PCP        HPI:     Ophelia Funez 62 years old female who returns for follow-up regarding her right foot.    The patient reports that she is doing well .She is able to do her normal daily activities without any issue and she went back to normal work. She states that she wears her insert with shoes daily .She would like to know if the backbone that has broken in her ankle will grow back.      Allergies   Allergen Reactions   • Codeine Palpitations and Rash       Current Outpatient Medications on File Prior to Visit   Medication Sig Dispense Refill   • ibuprofen (ADVIL,MOTRIN) 600 MG tablet Take 600 mg by mouth Every 6 (Six) Hours As Needed.     • multivitamin with minerals tablet tablet Take 1 tablet by mouth Daily.     • omeprazole (priLOSEC) 40 MG capsule Take 40 mg by mouth Daily.     • [DISCONTINUED] Diclofenac Sodium (Pennsaid) 2 % solution Apply 40 mg topically 2 (two) times a day. 112 g 3   • [DISCONTINUED] meloxicam (MOBIC) 15 MG tablet TAKE 1 TABLET BY MOUTH EVERY DAY  30 tablet 0   • [DISCONTINUED] meloxicam (Mobic) 15 MG tablet Take 1 tablet by mouth Daily. 30 tablet 0     No current facility-administered medications on file prior to visit.       Objective   BP (!) 164/101   Pulse 103   Ht 175.3 cm (69\")   Wt 112 kg (246 lb)   BMI 36.33 kg/m²     Foot/Ankle Exam:       General:   Appearance: appears stated age and healthy    Orientation: AAOx3    Affect: appropriate    Gait: antalgic    Gait comment:  Significant pronation and forefoot abduction with stance and gait    VASCULAR      Right Foot Vascularity   Normal vascular exam    Dorsalis pedis:  2+  Posterior tibial:  2+  Skin Temperature: warm    Edema Gradin+ and " pitting  CFT:  < 3 seconds  Pedal Hair Growth:  Absent  Varicosities: none        NEUROLOGIC     Right Foot Neurologic   Light touch sensation:  Normal  Hot/Cold sensation: normal    Protective Sensation using [place] Monofilament:  10  Achilles reflex:  2+     MUSCULOSKELETAL      Right Foot Musculoskeletal   Ecchymosis:  None  Tenderness: medial malleolus, lateral malleolus, subtalar joint, posterior tibial tendon and anterior tibiotalar joint line    Arch:  Pes planus  Hammertoe:  Second toe, third toe, fourth toe and fifth toe  Hallux valgus: Yes (Severe deformity)       MUSCLE STRENGTH     Right Foot Muscle Strength   Foot dorsiflexion:  4+  Foot plantar flexion:  4+  Foot inversion:  4+  Foot eversion:  4+     DERMATOLOGIC     Right Foot Dermatologic   Skin: skin intact        Right Foot Additional Comments Significant valgus deformity involving the tibiotalar joint.  Prominent instability across the deltoid ligament.  Diffuse discomfort to the perimalleoli region.  No proximal fibular pain.  No significant discomfort involving the foot.        No pain, inflammation, or swelling involving the hayden malleolar region.   Incision sites are well healed.   Prominent rigidity involving the ankle secondary to arthrodesis.            Assessment & Plan   Diagnoses and all orders for this visit:    1. 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 Acquired hallux valgus, right    4. Rigid pes planus of right foot        The patient has been advised to continue wearing supportive shoes and inserts on daily basis. I also recommended her to avoid and stay away from the barefoot, flip flop sandals.     Follow up on an as needed basis.  Greater than 20 minutes spent before coming during and after evaluation for patient care.    Transcribed from ambient dictation for EDITA Diego DPM by Reza Ross.  06/30/22   17:16 EDT    Patient verbalized consent to the visit recording.        No  orders of the defined types were placed in this encounter.         Note is dictated utilizing voice recognition software. Unfortunately this leads to occasional typographical errors. I apologize in advance if the situation occurs. If questions occur please do not hesitate to call our office.

## 2024-06-14 ENCOUNTER — TRANSCRIBE ORDERS (OUTPATIENT)
Dept: ADMINISTRATIVE | Facility: HOSPITAL | Age: 64
End: 2024-06-14
Payer: COMMERCIAL

## 2024-06-14 DIAGNOSIS — R94.5 NONSPECIFIC ABNORMAL RESULTS OF LIVER FUNCTION STUDY: Primary | ICD-10-CM

## 2024-06-17 PROCEDURE — 88305 TISSUE EXAM BY PATHOLOGIST: CPT | Performed by: INTERNAL MEDICINE

## 2024-06-18 ENCOUNTER — LAB REQUISITION (OUTPATIENT)
Dept: LAB | Facility: HOSPITAL | Age: 64
End: 2024-06-18
Payer: COMMERCIAL

## 2024-06-18 DIAGNOSIS — K21.9 GASTRO-ESOPHAGEAL REFLUX DISEASE WITHOUT ESOPHAGITIS: ICD-10-CM

## 2024-06-19 LAB
LAB AP CASE REPORT: NORMAL
PATH REPORT.FINAL DX SPEC: NORMAL
PATH REPORT.GROSS SPEC: NORMAL

## (undated) DEVICE — BIT DRL MAXTORQUE SQ CONN 4.7MM  BLU

## (undated) DEVICE — BNDG ESMARK 4IN 12FT LF STRL BLU

## (undated) DEVICE — GW MAXTORQUE 2.3MM BLU

## (undated) DEVICE — GLV SURG BIOGEL M LTX PF 7 1/2

## (undated) DEVICE — SOL IRRIG H2O 1000ML STRL

## (undated) DEVICE — BANDAGE,GAUZE,BULKEE II,4.5"X4.1YD,STRL: Brand: MEDLINE

## (undated) DEVICE — INTENDED FOR TISSUE SEPARATION, AND OTHER PROCEDURES THAT REQUIRE A SHARP SURGICAL BLADE TO PUNCTURE OR CUT.: Brand: BARD-PARKER ® CARBON RIB-BACK BLADES

## (undated) DEVICE — GAUZE,SPONGE,4"X4",32PLY,XRAY,STRL,LF: Brand: MEDLINE

## (undated) DEVICE — Device

## (undated) DEVICE — PENCL HND ROCKRSWTCH HOLSTR EZ CLEAN TP CRD 10FT

## (undated) DEVICE — DRILL BIT: Brand: MICA

## (undated) DEVICE — PAD CAST SYN PROTOUCH RL 4IN NS

## (undated) DEVICE — STAPLER, SKIN, 35W, A: Brand: MEDLINE INDUSTRIES, INC.

## (undated) DEVICE — GAUZE,SPONGE,FLUFF,6"X6.75",STRL,5/TRAY: Brand: MEDLINE

## (undated) DEVICE — C-ARM: Brand: UNBRANDED

## (undated) DEVICE — PAD,ABDOMINAL,5"X9",STERILE,LF,1/PK: Brand: MEDLINE INDUSTRIES, INC.

## (undated) DEVICE — SUT ETHLN 3/0 PS1 18IN 1663H

## (undated) DEVICE — BNDG ELAS MATRX V/CLS 4IN 5YD LF

## (undated) DEVICE — SUT VIC 2/0 CT2 27IN J269H

## (undated) DEVICE — UNDERGLV SURG BIOGEL INDICAT PI SZ8 BLU

## (undated) DEVICE — GOWN,REINFORCE,POLY,SIRUS,BREATH SLV,XLG: Brand: MEDLINE

## (undated) DEVICE — HEADED COUNTERSINK: Brand: DARCO

## (undated) DEVICE — HEADED DRILL BIT: Brand: DARCO

## (undated) DEVICE — BNDG ELAS ELITE V/CLOSE 4IN 5YD LF STRL

## (undated) DEVICE — MICRO DUAL CUT (9.0 X 0.38 X 25.0MM)

## (undated) DEVICE — KT SURG TURNOVER 050

## (undated) DEVICE — PAD CAST SYN PROTOUCH RL 3IN NS

## (undated) DEVICE — OCCLUSIVE GAUZE STRIP OVERWRAP,3% BISMUTH TRIBROMOPHENATE IN PETROLATUM BLEND: Brand: XEROFORM

## (undated) DEVICE — UNDYED BRAIDED (POLYGLACTIN 910), SYNTHETIC ABSORBABLE SUTURE: Brand: COATED VICRYL

## (undated) DEVICE — FOAM BUMP, LARGE: Brand: MEDLINE INDUSTRIES, INC.

## (undated) DEVICE — PK EXTREM 50